# Patient Record
Sex: MALE | Race: WHITE | HISPANIC OR LATINO | URBAN - METROPOLITAN AREA
[De-identification: names, ages, dates, MRNs, and addresses within clinical notes are randomized per-mention and may not be internally consistent; named-entity substitution may affect disease eponyms.]

---

## 2018-07-13 ENCOUNTER — OUTPATIENT (OUTPATIENT)
Dept: OUTPATIENT SERVICES | Facility: HOSPITAL | Age: 48
LOS: 1 days | Discharge: HOME | End: 2018-07-13

## 2018-07-16 DIAGNOSIS — G47.33 OBSTRUCTIVE SLEEP APNEA (ADULT) (PEDIATRIC): ICD-10-CM

## 2019-03-11 ENCOUNTER — TRANSCRIPTION ENCOUNTER (OUTPATIENT)
Age: 49
End: 2019-03-11

## 2019-05-05 ENCOUNTER — EMERGENCY (EMERGENCY)
Facility: HOSPITAL | Age: 49
LOS: 0 days | Discharge: HOME | End: 2019-05-05
Attending: EMERGENCY MEDICINE | Admitting: EMERGENCY MEDICINE
Payer: COMMERCIAL

## 2019-05-05 VITALS
SYSTOLIC BLOOD PRESSURE: 137 MMHG | RESPIRATION RATE: 18 BRPM | OXYGEN SATURATION: 95 % | DIASTOLIC BLOOD PRESSURE: 67 MMHG | HEART RATE: 83 BPM | TEMPERATURE: 100 F

## 2019-05-05 VITALS
OXYGEN SATURATION: 96 % | TEMPERATURE: 98 F | SYSTOLIC BLOOD PRESSURE: 157 MMHG | DIASTOLIC BLOOD PRESSURE: 88 MMHG | RESPIRATION RATE: 18 BRPM | HEART RATE: 111 BPM
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
## 2019-05-05 DIAGNOSIS — F17.210 NICOTINE DEPENDENCE, CIGARETTES, UNCOMPLICATED: ICD-10-CM
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
## 2019-05-05 DIAGNOSIS — R10.30 LOWER ABDOMINAL PAIN, UNSPECIFIED: ICD-10-CM

## 2019-05-05 DIAGNOSIS — E11.9 TYPE 2 DIABETES MELLITUS WITHOUT COMPLICATIONS: ICD-10-CM

## 2019-05-05 DIAGNOSIS — N50.812 LEFT TESTICULAR PAIN: ICD-10-CM
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
## 2019-05-05 DIAGNOSIS — I10 ESSENTIAL (PRIMARY) HYPERTENSION: ICD-10-CM

## 2019-05-05 DIAGNOSIS — S39.94XA UNSPECIFIED INJURY OF EXTERNAL GENITALS, INITIAL ENCOUNTER: ICD-10-CM

## 2019-05-05 DIAGNOSIS — N39.0 URINARY TRACT INFECTION, SITE NOT SPECIFIED: ICD-10-CM

## 2019-05-05 LAB
ALBUMIN SERPL ELPH-MCNC: 4.7 G/DL — SIGNIFICANT CHANGE UP (ref 3.5–5.2)
ALP SERPL-CCNC: 69 U/L — SIGNIFICANT CHANGE UP (ref 30–115)
ALT FLD-CCNC: 31 U/L — SIGNIFICANT CHANGE UP (ref 0–41)
ANION GAP SERPL CALC-SCNC: 15 MMOL/L — HIGH (ref 7–14)
APPEARANCE UR: ABNORMAL
AST SERPL-CCNC: 20 U/L — SIGNIFICANT CHANGE UP (ref 0–41)
BACTERIA # UR AUTO: ABNORMAL /HPF
BILIRUB SERPL-MCNC: 0.5 MG/DL — SIGNIFICANT CHANGE UP (ref 0.2–1.2)
BILIRUB UR-MCNC: ABNORMAL
BUN SERPL-MCNC: 11 MG/DL — SIGNIFICANT CHANGE UP (ref 10–20)
CALCIUM SERPL-MCNC: 9.9 MG/DL — SIGNIFICANT CHANGE UP (ref 8.5–10.1)
CHLORIDE SERPL-SCNC: 103 MMOL/L — SIGNIFICANT CHANGE UP (ref 98–110)
CO2 SERPL-SCNC: 22 MMOL/L — SIGNIFICANT CHANGE UP (ref 17–32)
COLOR SPEC: SIGNIFICANT CHANGE UP
COMMENT - URINE: SIGNIFICANT CHANGE UP
CREAT SERPL-MCNC: 0.8 MG/DL — SIGNIFICANT CHANGE UP (ref 0.7–1.5)
DIFF PNL FLD: ABNORMAL
GLUCOSE SERPL-MCNC: 195 MG/DL — HIGH (ref 70–99)
GLUCOSE UR QL: 250
HCT VFR BLD CALC: 44.9 % — SIGNIFICANT CHANGE UP (ref 42–52)
HGB BLD-MCNC: 15.1 G/DL — SIGNIFICANT CHANGE UP (ref 14–18)
KETONES UR-MCNC: NEGATIVE — SIGNIFICANT CHANGE UP
LACTATE SERPL-SCNC: 1.8 MMOL/L — SIGNIFICANT CHANGE UP (ref 0.5–2.2)
LEUKOCYTE ESTERASE UR-ACNC: ABNORMAL
MCHC RBC-ENTMCNC: 29.2 PG — SIGNIFICANT CHANGE UP (ref 27–31)
MCHC RBC-ENTMCNC: 33.6 G/DL — SIGNIFICANT CHANGE UP (ref 32–37)
MCV RBC AUTO: 86.8 FL — SIGNIFICANT CHANGE UP (ref 80–94)
NITRITE UR-MCNC: NEGATIVE — SIGNIFICANT CHANGE UP
NRBC # BLD: 0 /100 WBCS — SIGNIFICANT CHANGE UP (ref 0–0)
PH UR: 5.5 — SIGNIFICANT CHANGE UP (ref 5–8)
PLATELET # BLD AUTO: 298 K/UL — SIGNIFICANT CHANGE UP (ref 130–400)
POTASSIUM SERPL-MCNC: 4.4 MMOL/L — SIGNIFICANT CHANGE UP (ref 3.5–5)
POTASSIUM SERPL-SCNC: 4.4 MMOL/L — SIGNIFICANT CHANGE UP (ref 3.5–5)
PROT SERPL-MCNC: 7.1 G/DL — SIGNIFICANT CHANGE UP (ref 6–8)
PROT UR-MCNC: 30
RBC # BLD: 5.17 M/UL — SIGNIFICANT CHANGE UP (ref 4.7–6.1)
RBC # FLD: 13 % — SIGNIFICANT CHANGE UP (ref 11.5–14.5)
RBC CASTS # UR COMP ASSIST: ABNORMAL /HPF
SODIUM SERPL-SCNC: 140 MMOL/L — SIGNIFICANT CHANGE UP (ref 135–146)
SP GR SPEC: >=1.03 — SIGNIFICANT CHANGE UP (ref 1.01–1.03)
UROBILINOGEN FLD QL: 0.2 — SIGNIFICANT CHANGE UP (ref 0.2–0.2)
WBC # BLD: 16.41 K/UL — HIGH (ref 4.8–10.8)
WBC # FLD AUTO: 16.41 K/UL — HIGH (ref 4.8–10.8)
WBC UR QL: ABNORMAL /HPF

## 2019-05-05 PROCEDURE — 99284 EMERGENCY DEPT VISIT MOD MDM: CPT

## 2019-05-05 PROCEDURE — 76870 US EXAM SCROTUM: CPT | Mod: 26

## 2019-05-05 RX ORDER — MORPHINE SULFATE 50 MG/1
4 CAPSULE, EXTENDED RELEASE ORAL ONCE
Qty: 0 | Refills: 0 | Status: DISCONTINUED | OUTPATIENT
Start: 2019-05-05 | End: 2019-05-05

## 2019-05-05 RX ORDER — CIPROFLOXACIN LACTATE 400MG/40ML
1 VIAL (ML) INTRAVENOUS
Qty: 10 | Refills: 0 | OUTPATIENT
Start: 2019-05-05 | End: 2019-05-14

## 2019-05-05 RX ADMIN — MORPHINE SULFATE 4 MILLIGRAM(S): 50 CAPSULE, EXTENDED RELEASE ORAL at 22:19

## 2019-05-05 RX ADMIN — MORPHINE SULFATE 4 MILLIGRAM(S): 50 CAPSULE, EXTENDED RELEASE ORAL at 17:35

## 2019-05-05 RX ADMIN — MORPHINE SULFATE 4 MILLIGRAM(S): 50 CAPSULE, EXTENDED RELEASE ORAL at 18:10

## 2019-05-05 NOTE — ED PROVIDER NOTE - PHYSICAL EXAMINATION
GENERAL:  well appearing, non-toxic male in no acute distress  SKIN: skin warm, pink and dry. MMM.   PULM: CTAB. Normal respiratory effort. No respiratory distress. No wheezes, stridor, rales or rhonchi. No retractions  CV: RRR, no M/R/G.   ABD: Soft, non-tender, non-distended. No rebound or guarding. No CVA tenderness.  : performed by Dr. Bellamy. + left sided testicular swelling and tenderness.  NEURO: A+Ox3, no sensory/motor deficits  PSYCH: appropriate behavior, cooperative.

## 2019-05-05 NOTE — ED ADULT NURSE NOTE - NSIMPLEMENTINTERV_GEN_ALL_ED
Implemented All Universal Safety Interventions:  [place] to call system. Call bell, personal items and telephone within reach. Instruct patient to call for assistance. Room bathroom lighting operational. Non-slip footwear when patient is off stretcher. Physically safe environment: no spills, clutter or unnecessary equipment. Stretcher in lowest position, wheels locked, appropriate side rails in place.

## 2019-05-05 NOTE — ED PROVIDER NOTE - NS ED ROS FT
Constitutional: no fever, chills  Cardiovascular: no chest pain, no sob, no syncope   Respiratory: no cough, no shortness of breath  Gastrointestinal: no nausea, vomiting or diarrhea. no abdominal pain.   : + left testicular pain and swelling, + left lower back pain.  no urinary sxs  Integumentary: no rash or skin changes. no edema  Neurological: no headache, no dizziness, no visual changes, no UE/LE weakness or paresthesias.

## 2019-05-05 NOTE — ED PROVIDER NOTE - PROGRESS NOTE DETAILS
Case discussed with GI fellow, recommends enemas every 4 hours for constipation. Offered patient enema in ED but patient prefers to do the enemas at home. Recommend follow up with GI in 1-2 days. On rectal exam no stool in distal rectal vault. s/o Dr. Garcia f/u arianne and re-katya I, Dr. Garcia, received signout from Dr. Bellamy - pending US results. Kavon carrasco- agree with above hx and PE. sono indicating enlaged epididimus and small hydrocele. no hematoma. will give levaquin for infected urine. Reviewed all results and necessity for follow up. Counseled on red flags and to return for them.  Patient appears well on discharge.

## 2019-05-05 NOTE — ED PROVIDER NOTE - ATTENDING CONTRIBUTION TO CARE
48 yr old male here for eval of left sided testicular pain. pt reports that while getting up from recliner fell and landed on his left testicle. pt feels the way he landed he squished his testicle. since then pain that radiates into abdomen. no fever, chill. pt has noticed blood in urine.  on exam abd soft nt/nd, no cva ttp, left testicle with swelling and tenderness.

## 2019-05-05 NOTE — ED PROVIDER NOTE - CLINICAL SUMMARY MEDICAL DECISION MAKING FREE TEXT BOX
pw testicular pain and swelling after trauma. w/u showing epididymitis w positive UA. Patient to be discharged from ED. Any available test results were discussed with patient and/or family. Verbal instructions given, including instructions to return to ED immediately for any new, worsening, or concerning symptoms. Patient endorsed understanding. Written discharge instructions additionally given, including follow-up plan with Urology.

## 2019-05-05 NOTE — ED PROVIDER NOTE - OBJECTIVE STATEMENT
49 yo male with h/o DM, HTN, HLD presents to the c/o left sided testicular pain x 2 days. Patient explains he was getting up from his recliner, fell back onto the recliner and thinks he landed on his left testicle, since then patient with left testicular pain with radiation to left lower abdominal pain and left flank pain. Patient noticed blood in his urine as well. Denies fever, chills, chest pain, sob, abd pain, N/V, UE/LE weakness or paresthesias, dysuria, increased frequency/urgency. Associated with left sided testicular swelling.

## 2019-05-05 NOTE — ED PROVIDER NOTE - CARE PLAN
Principal Discharge DX:	UTI (urinary tract infection)  Secondary Diagnosis:	Testicular pain  Secondary Diagnosis:	Fall

## 2019-05-05 NOTE — ED ADULT NURSE REASSESSMENT NOTE - NS ED NURSE REASSESS COMMENT FT1
received pt from triage with c/o left groin pain radiating to the left flank and swollen testicles. pt also stated that he noticed blood in his urine 2 days ago. Pt denies fever, SOB or chest pain. Awaiting to be seen by MD. Will continue to monitor.
Pt A&O X 3, NAD, VSS with groin pain 9/10.  Pt given morphine 4 mg as per MD order pain 7/10. Safety measures maintained, SR^, call bell within reach, family at bedside, steady gait, resting comfortably. pt awaiting further MD evaluation, will continue to monitor.

## 2019-05-05 NOTE — ED PROVIDER NOTE - CARE PROVIDER_API CALL
David Munroe)  Urology  [address] [place] [address], [address]  [place] [address]  Phone: [phone]  Fax: [phone]  Follow Up Time: 1-3 Days

## 2019-05-05 NOTE — ED PROVIDER NOTE - NSFOLLOWUPINSTRUCTIONS_ED_ALL_ED_FT
Fall Prevention in the Home  ImageFalls can cause injuries. They can happen to people of all ages. There are many things you can do to make your home safe and to help prevent falls.    What can I do on the outside of my home?  Regularly fix the edges of walkways and driveways and fix any cracks.  Remove anything that might make you trip as you walk through a door, such as a raised step or threshold.  Trim any bushes or trees on the path to your home.  Use bright outdoor lighting.  Clear any walking paths of anything that might make someone trip, such as rocks or tools.  Regularly check to see if handrails are loose or broken. Make sure that both sides of any steps have handrails.  Any raised decks and porches should have guardrails on the edges.  Have any leaves, snow, or ice cleared regularly.  Use sand or salt on walking paths during winter.  Clean up any spills in your garage right away. This includes oil or grease spills.  What can I do in the bathroom?  Use night lights.  Install grab bars by the toilet and in the tub and shower. Do not use towel bars as grab bars.  Use non-skid mats or decals in the tub or shower.  If you need to sit down in the shower, use a plastic, non-slip stool.  Keep the floor dry. Clean up any water that spills on the floor as soon as it happens.  Remove soap buildup in the tub or shower regularly.  Attach bath mats securely with double-sided non-slip rug tape.  Do not have throw rugs and other things on the floor that can make you trip.  What can I do in the bedroom?  Use night lights.  Make sure that you have a light by your bed that is easy to reach.  Do not use any sheets or blankets that are too big for your bed. They should not hang down onto the floor.  Have a firm chair that has side arms. You can use this for support while you get dressed.  Do not have throw rugs and other things on the floor that can make you trip.  What can I do in the kitchen?  Clean up any spills right away.  Avoid walking on wet floors.  Keep items that you use a lot in easy-to-reach places.  If you need to reach something above you, use a strong step stool that has a grab bar.  Keep electrical cords out of the way.  Do not use floor polish or wax that makes floors slippery. If you must use wax, use non-skid floor wax.  Do not have throw rugs and other things on the floor that can make you trip.  What can I do with my stairs?  Do not leave any items on the stairs.  Make sure that there are handrails on both sides of the stairs and use them. Fix handrails that are broken or loose. Make sure that handrails are as long as the stairways.  Check any carpeting to make sure that it is firmly attached to the stairs. Fix any carpet that is loose or worn.  Avoid having throw rugs at the top or bottom of the stairs. If you do have throw rugs, attach them to the floor with carpet tape.  Make sure that you have a light switch at the top of the stairs and the bottom of the stairs. If you do not have them, ask someone to add them for you.  What else can I do to help prevent falls?  Wear shoes that:    Do not have high heels.  Have rubber bottoms.  Are comfortable and fit you well.  Are closed at the toe. Do not wear sandals.    If you use a stepladder:    Make sure that it is fully opened. Do not climb a closed stepladder.  Make sure that both sides of the stepladder are locked into place.  Ask someone to hold it for you, if possible.    Clearly winter and make sure that you can see:    Any grab bars or handrails.  First and last steps.  Where the edge of each step is.    Use tools that help you move around (mobility aids) if they are needed. These include:    Canes.  Walkers.  Scooters.  Crutches.    Turn on the lights when you go into a dark area. Replace any light bulbs as soon as they burn out.  Set up your furniture so you have a clear path. Avoid moving your furniture around.  If any of your floors are uneven, fix them.  If there are any pets around you, be aware of where they are.  Review your medicines with your doctor. Some medicines can make you feel dizzy. This can increase your chance of falling.  Ask your doctor what other things that you can do to help prevent falls.    This information is not intended to replace advice given to you by your health care provider. Make sure you discuss any questions you have with your health care provider.     Urinary Tract Infection    A urinary tract infection (UTI) is an infection of any part of the urinary tract, which includes the kidneys, ureters, bladder, and urethra. Risk factors include ignoring your need to urinate, wiping back to front if female, being an uncircumcised male, and having diabetes or a weak immune system. Symptoms include frequent urination, pain or burning with urination, foul smelling urine, cloudy urine, pain in the lower abdomen, blood in the urine, and fever. If you were prescribed an antibiotic medicine, take it as told by your health care provider. Do not stop taking the antibiotic even if you start to feel better.    SEEK IMMEDIATE MEDICAL CARE IF YOU HAVE ANY OF THE FOLLOWING SYMPTOMS: severe back or abdominal pain, fever, inability to keep fluids or medicine down, dizziness/lightheadedness, or a change in mental status.

## 2019-05-06 LAB
CULTURE RESULTS: NO GROWTH — SIGNIFICANT CHANGE UP
SPECIMEN SOURCE: SIGNIFICANT CHANGE UP

## 2019-05-08 PROBLEM — Z00.00 ENCOUNTER FOR PREVENTIVE HEALTH EXAMINATION: Status: ACTIVE | Noted: 2019-05-08

## 2019-05-09 ENCOUNTER — APPOINTMENT (OUTPATIENT)
Dept: UROLOGY | Facility: CLINIC | Age: 49
End: 2019-05-09
Payer: COMMERCIAL

## 2019-05-09 ENCOUNTER — OUTPATIENT (OUTPATIENT)
Dept: OUTPATIENT SERVICES | Facility: HOSPITAL | Age: 49
LOS: 1 days | Discharge: HOME | End: 2019-05-09

## 2019-05-09 VITALS
SYSTOLIC BLOOD PRESSURE: 144 MMHG | DIASTOLIC BLOOD PRESSURE: 82 MMHG | BODY MASS INDEX: 38.51 KG/M2 | WEIGHT: 269 LBS | HEIGHT: 70 IN | HEART RATE: 91 BPM

## 2019-05-09 DIAGNOSIS — E78.00 PURE HYPERCHOLESTEROLEMIA, UNSPECIFIED: ICD-10-CM

## 2019-05-09 DIAGNOSIS — F17.200 NICOTINE DEPENDENCE, UNSPECIFIED, UNCOMPLICATED: ICD-10-CM

## 2019-05-09 DIAGNOSIS — Z82.49 FAMILY HISTORY OF ISCHEMIC HEART DISEASE AND OTHER DISEASES OF THE CIRCULATORY SYSTEM: ICD-10-CM

## 2019-05-09 DIAGNOSIS — Z80.7 FAMILY HISTORY OF OTHER MALIGNANT NEOPLASMS OF LYMPHOID, HEMATOPOIETIC AND RELATED TISSUES: ICD-10-CM

## 2019-05-09 DIAGNOSIS — Z83.3 FAMILY HISTORY OF DIABETES MELLITUS: ICD-10-CM

## 2019-05-09 PROCEDURE — 99204 OFFICE O/P NEW MOD 45 MIN: CPT

## 2019-05-09 RX ORDER — GLIMEPIRIDE 4 MG/1
4 TABLET ORAL
Refills: 0 | Status: ACTIVE | COMMUNITY

## 2019-05-09 RX ORDER — CALCIUM CARBONATE/VITAMIN D3 600 MG-10
TABLET ORAL
Refills: 0 | Status: ACTIVE | COMMUNITY

## 2019-05-09 RX ORDER — DULAGLUTIDE 1.5 MG/.5ML
1.5 INJECTION, SOLUTION SUBCUTANEOUS
Refills: 0 | Status: ACTIVE | COMMUNITY

## 2019-05-09 RX ORDER — FENOFIBRATE 145 MG/1
145 TABLET ORAL
Refills: 0 | Status: ACTIVE | COMMUNITY

## 2019-05-10 DIAGNOSIS — R79.89 OTHER SPECIFIED ABNORMAL FINDINGS OF BLOOD CHEMISTRY: ICD-10-CM

## 2019-05-10 LAB
APPEARANCE: CLEAR
BILIRUBIN URINE: NEGATIVE
BLOOD URINE: NEGATIVE
COLOR: YELLOW
GLUCOSE QUALITATIVE U: >=1000
KETONES URINE: NEGATIVE
LEUKOCYTE ESTERASE URINE: NEGATIVE
NITRITE URINE: NEGATIVE
PH URINE: 6
PROTEIN URINE: NEGATIVE
SPECIFIC GRAVITY URINE: >=1.03
UROBILINOGEN URINE: 0.2 (ref 0.2–?)

## 2019-05-13 LAB
BACTERIA UR CULT: NORMAL
URINE CYTOLOGY: NORMAL

## 2019-08-05 ENCOUNTER — APPOINTMENT (OUTPATIENT)
Dept: UROLOGY | Facility: CLINIC | Age: 49
End: 2019-08-05
Payer: COMMERCIAL

## 2019-08-05 VITALS
HEIGHT: 70 IN | SYSTOLIC BLOOD PRESSURE: 144 MMHG | DIASTOLIC BLOOD PRESSURE: 82 MMHG | HEART RATE: 91 BPM | WEIGHT: 269 LBS | BODY MASS INDEX: 38.51 KG/M2

## 2019-08-05 PROCEDURE — 99213 OFFICE O/P EST LOW 20 MIN: CPT

## 2019-08-05 RX ORDER — LEVOFLOXACIN 500 MG/1
500 TABLET, FILM COATED ORAL
Refills: 0 | Status: COMPLETED | COMMUNITY
End: 2019-08-05

## 2019-08-05 NOTE — PHYSICAL EXAM
[General Appearance - Well Developed] : well developed [Normal Appearance] : normal appearance [General Appearance - Well Nourished] : well nourished [General Appearance - In No Acute Distress] : no acute distress [Well Groomed] : well groomed [Heart Rate And Rhythm] : Heart rate and rhythm were normal [] : no respiratory distress [Exaggerated Use Of Accessory Muscles For Inspiration] : no accessory muscle use [Respiration, Rhythm And Depth] : normal respiratory rhythm and effort [Auscultation Breath Sounds / Voice Sounds] : lungs were clear to auscultation bilaterally [Abdomen Soft] : soft [Abdomen Tenderness] : non-tender [Abdomen Hernia] : no hernia was discovered [Costovertebral Angle Tenderness] : no ~M costovertebral angle tenderness [Urethral Meatus] : meatus normal [Penis Abnormality] : normal circumcised penis [No Focal Deficits] : no focal deficits [Affect] : the affect was normal [Oriented To Time, Place, And Person] : oriented to person, place, and time [Mood] : the mood was normal [Not Anxious] : not anxious [Inguinal Lymph Nodes Enlarged Bilaterally] : inguinal [FreeTextEntry1] : DTR's were intact

## 2019-08-05 NOTE — PHYSICAL EXAM
[General Appearance - Well Developed] : well developed [General Appearance - Well Nourished] : well nourished [Normal Appearance] : normal appearance [Well Groomed] : well groomed [General Appearance - In No Acute Distress] : no acute distress [Respiration, Rhythm And Depth] : normal respiratory rhythm and effort [Edema] : no peripheral edema [] : no respiratory distress [Exaggerated Use Of Accessory Muscles For Inspiration] : no accessory muscle use [Oriented To Time, Place, And Person] : oriented to person, place, and time [Affect] : the affect was normal [Not Anxious] : not anxious [Mood] : the mood was normal [Normal Station and Gait] : the gait and station were normal for the patient's age [No Focal Deficits] : no focal deficits [FreeTextEntry1] : Morbidly obes

## 2019-08-05 NOTE — LETTER HEADER
[FreeTextEntry3] : David Munroe M.D.\par Director of Urology\par [facility]\par [address] [place] [address], [address]\[place] [address]

## 2019-08-05 NOTE — ASSESSMENT
[FreeTextEntry1] : His physical exam in addition to the morbid obesity shows him to be is distinctly uncomfortable to the point that he walks very slowly and can get back on the examination bed with out difficulty. He has morbid obesity with about 40% of the shaft of the penis buried in the suprapubic fat. The right testicle is of normal size hard to believe that he has hypogonadism with the testicle that size the left is enlarged swollen with the overlying skin somewhat indurated with edema. Rectal exam showed about a 50 g a nodular prostate he had good rectal tone. Because of the buried penis by the tender testicle I did not try for BC reflex.\par \par With respect to the epididymitis he is getting better, if he doesn’t improve significantly by next week will get another ultrasound as the timing related to the trauma implies more traumatic event an infection but he did have pyuria albeit without bacteria. With respect to his gross hematuria especially given the fact that his microscopic exam had a fair number of red cells will get a repeat the urine but I’m going to get a CT urogram. He is a strong smoking history for many many many years now down to half a pack a day but this needs to be checked out. With respect to his low testosterone I want to see where he is. If he is indeed low it may be due to hyper convergence secondary to his obesity and he may need off label use of Arimidex as opposed to straight testosterone. Finally with respect to his erectile dysfunction going to need to know his [place] criteria classification and I want to make sure his hormones are in order because of he is hypogonadal correcting that may correct his erectile dysfunction with the same time.\par \par

## 2019-08-05 NOTE — LETTER BODY
[Dear  ___] : Dear  [unfilled], [FreeTextEntry2] : Deborah Lombardo DO\par [address]\[place] [address]\par

## 2019-08-05 NOTE — REVIEW OF SYSTEMS
[see HPI] : see HPI [Erectile Dysfunction] : erectile dysfunction [Negative] : Heme/Lymph [Fever] : no fever

## 2019-08-05 NOTE — HISTORY OF PRESENT ILLNESS
[Currently Experiencing ___] :  [unfilled] [Erectile Dysfunction] : Erectile Dysfunction [Hematuria - Gross] : gross hematuria [None] : None [FreeTextEntry1] : Christopher is a  48-year-old male who has a long history of urologic issues including erectile dysfunction, gross hematuria, and epididymitis.\par \par Concerning his erectile dysfunction he is complaining of decreased penile rigidity with intermittent episode of losing his erections during intercourse. He presents today to review his [place] criteria.\par \par He had episode of gross hematuria after he fell down and at which time he believes he suffered a testicular injury. Ultrasound in the emergency room was consistent with left epididymitis without traumatic evidence. His urine analysis showed some pyuria so he was sent home with a diagnosis of UTI and epididymitis on Levaquin. By now His testicular pain has resolved. He was supposed to obtain a CT urogram however, he did not do so stating that his gross hematuria has resolved. He now understands that any episode of gross hematuria requires an appropriate workup\par \par Additionally he Reports that he has a history of decreased testosterone managed in the past by testosterone injections. He says that he tried and failed the cream in the past. He presents to review his blood work

## 2019-08-05 NOTE — LETTER BODY
[Dear  ___] : Dear  [unfilled], [Courtesy Letter:] : I had the pleasure of seeing your patient, [unfilled], in my office today. [Please see my note below.] : Please see my note below. [Consult Closing:] : Thank you very much for allowing me to participate in the care of this patient.  If you have any questions, please do not hesitate to contact me. [Sincerely,] : Sincerely, [FreeTextEntry2] : Deborah Lombardo DO\par [address]\[place] [address]\par

## 2019-08-05 NOTE — LETTER HEADER
[FreeTextEntry3] : David Munroe M.D.\par Director of Urology\par [facility]\par [address] [place] [address], [address]\[place] [address]

## 2019-08-05 NOTE — ASSESSMENT
[FreeTextEntry1] : He did not obtain a CT urogram or the appropriate blood work. I reviewed as to why we ordered what we ordered including appropriate hematuria workup and testosterone level evaluation.\par \par Concerning his erectile dysfunction, he did obtain clearance to begin PDE 5 inhibitors. He will start sildenafil 100 mg, one half or up to one tablet prior to intercourse. All usage, dosage, mechanism of action, and adverse events have been fully reviewed. He is not to take more than 100 mg in a 24-hour period.\par \par He will get CT urogram and the appropriate blood work and follow up for review of the studies as well as his to the Sildenafil.\par

## 2019-08-05 NOTE — HISTORY OF PRESENT ILLNESS
[Currently Experiencing ___] :  [unfilled] [Erectile Dysfunction] : Erectile Dysfunction [Hematuria - Gross] : gross hematuria [8] : 8 [None] : There is no radiation [Sudden] : sudden [___ Day(s) Ago] : [unfilled] day(s) ago [Constant] : constantly [Improving] : improving [Moderate] : moderate in severity [FreeTextEntry1] : Christopher is a rather uncomfortable 48-year-old male who has a long history of urologic issues for which he never went to anyone because he was embarrassed. They one of longest duration is probably erectile dysfunction. He can get rigid he can achieve penetration but it is not reliable and sometimes he can complete the act. He has no one precipitating factor it’s been happening slowly over time and he is not tried any medication for it. He is in a monogamous relationship with one lady friend they have a good relationship but sex just isn’t there and he doesn’t think the problem is her.\par \par About a month ago he started noticing some pink urine was a little busy so didn’t have a chance to go to was  However four days ago he was getting out of his chair fell back he thinks he landed on his testicle had severe pain and went to the emergency room. An ultrasound was done which was consistent with left epididymitis. His urine analysis showed some pyuria so [facility] and epididymitis on Levaquin (he tells me he was not told re-the risk of tendon rupture I did tell him that now and he noted 30 days since the last dose before he can do any heavy lifting). The urine culture came back as no growth but that doesn’t mean it was in an epididymitis. Since leaving the emergency room it finally started to get a little better but he still has trouble sitting back on a chair or crossing his legs. Additionally he had been told he had a low testosterone the past. He been put on medication his doctor left the practice of the covering physician told him that his numbers were too good he has not had any medication attesting in over a year but feels that he is low and felt better when he was being treated. I do not know if he was being juiced or was appropriately and a level and he doesn’t have copies of his bloods.\par  [de-identified] : lef testicle [de-identified] : touch or movement

## 2019-08-08 ENCOUNTER — OTHER (OUTPATIENT)
Age: 49
End: 2019-08-08

## 2019-12-05 ENCOUNTER — APPOINTMENT (OUTPATIENT)
Dept: UROLOGY | Facility: CLINIC | Age: 49
End: 2019-12-05
Payer: COMMERCIAL

## 2019-12-05 VITALS
SYSTOLIC BLOOD PRESSURE: 180 MMHG | WEIGHT: 269 LBS | HEIGHT: 70 IN | DIASTOLIC BLOOD PRESSURE: 77 MMHG | HEART RATE: 84 BPM | BODY MASS INDEX: 38.51 KG/M2

## 2019-12-05 DIAGNOSIS — N45.1 EPIDIDYMITIS: ICD-10-CM

## 2019-12-05 PROCEDURE — 99213 OFFICE O/P EST LOW 20 MIN: CPT

## 2019-12-09 NOTE — ASSESSMENT
[FreeTextEntry1] : And becomes even more His erections improved the sildenafil however, he is still symptomatic. His testosterone is significantly low and we have two sets of blood work confirming this. At this time he elects to begin testosterone. We discussed different modalities and he wishes to start with injectable testosterone. We will start with 1 mg q. 2 weeks obtain trough and peak see him back for review. He'll bring in testosterone for injection  training.\par \par Concerning his PSA, it is elevated for his age and of concern is this is even more of an issue considering his hypogonadism. He will obtain repeat PSA, and he will get trough/peak values. If the PSA significantly elevated we'll discuss further intervention.\par \par Finally, he needs a CT urogram as had an episode of gross hematuria and has significant smoking history. We reviewed the indications and he agrees. He understands the risks involved without having a proper hematuria workup.  I pointed out to him that this is the second time he followed up without getting a CT urogram. We had discussed the appropriate workup and what it entails. \par

## 2019-12-09 NOTE — PHYSICAL EXAM

## 2019-12-09 NOTE — LETTER HEADER
[FreeTextEntry3] : David Munroe M.D.\par Director of Urology\par [facility]\par [address] [place] [address], [address]\[place] [address]

## 2019-12-09 NOTE — HISTORY OF PRESENT ILLNESS
[Currently Experiencing ___] :  [unfilled] [Erectile Dysfunction] : Erectile Dysfunction [FreeTextEntry1] : Christopher is a  48-year-old male who has a long history of urologic issues including erectile dysfunction, and history of gross hematuria\par \par At his last visit he was given sildenafil 100 mg, after we reviewed his [place] criteria. He reports significant improvement in his erectile quality however, he still has decreased libido.\par \par He has history of decreased testosterone and was managed on intramuscular testosterone over a year ago. His prior blood work from July 2019 shows low total testosterone. He presents today to review his recent testosterone labs.\par \zain Additionally has a history of gross hematuria and we’d recommended he obtain a CT urogram however, he did not do so. \par

## 2019-12-09 NOTE — LETTER BODY
[Dear  ___] : Dear  [unfilled], [Courtesy Letter:] : I had the pleasure of seeing your patient, [unfilled], in my office today. [Please see my note below.] : Please see my note below. [Sincerely,] : Sincerely, [Consult Closing:] : Thank you very much for allowing me to participate in the care of this patient.  If you have any questions, please do not hesitate to contact me. [FreeTextEntry2] : Deborah Lombardo DO\par [address]\[place] [address]\par

## 2019-12-12 ENCOUNTER — FORM ENCOUNTER (OUTPATIENT)
Age: 49
End: 2019-12-12

## 2019-12-13 ENCOUNTER — OUTPATIENT (OUTPATIENT)
Dept: OUTPATIENT SERVICES | Facility: HOSPITAL | Age: 49
LOS: 1 days | Discharge: HOME | End: 2019-12-13
Payer: COMMERCIAL

## 2019-12-13 DIAGNOSIS — R31.0 GROSS HEMATURIA: ICD-10-CM

## 2019-12-13 PROCEDURE — 74178 CT ABD&PLV WO CNTR FLWD CNTR: CPT | Mod: 26

## 2019-12-18 ENCOUNTER — APPOINTMENT (OUTPATIENT)
Dept: UROLOGY | Facility: CLINIC | Age: 49
End: 2019-12-18
Payer: COMMERCIAL

## 2019-12-18 ENCOUNTER — TRANSCRIPTION ENCOUNTER (OUTPATIENT)
Age: 49
End: 2019-12-18

## 2019-12-18 VITALS
HEART RATE: 91 BPM | WEIGHT: 272 LBS | BODY MASS INDEX: 38.94 KG/M2 | SYSTOLIC BLOOD PRESSURE: 154 MMHG | HEIGHT: 70 IN | DIASTOLIC BLOOD PRESSURE: 82 MMHG

## 2019-12-18 DIAGNOSIS — E11.9 TYPE 2 DIABETES MELLITUS W/OUT COMPLICATIONS: ICD-10-CM

## 2019-12-18 PROCEDURE — 99213 OFFICE O/P EST LOW 20 MIN: CPT

## 2019-12-18 NOTE — LETTER BODY
[Please see my note below.] : Please see my note below. [Courtesy Letter:] : I had the pleasure of seeing your patient, [unfilled], in my office today. [Dear  ___] : Dear  [unfilled], [Sincerely,] : Sincerely, [Consult Closing:] : Thank you very much for allowing me to participate in the care of this patient.  If you have any questions, please do not hesitate to contact me. [FreeTextEntry2] : Deborah Lombardo DO\par [address]\[place] [address]\par

## 2019-12-18 NOTE — HISTORY OF PRESENT ILLNESS
[FreeTextEntry1] : Christopher was seen on December 5 we sent him for a CT urogram which she had not done we had discussed the importance of it he agreed to go and had it done December 13. The report was positive so I had him bring the medication and had him come in early so we could review the issues. He is here today for review possible injection training. [Currently Experiencing ___] :  [unfilled] [Erectile Dysfunction] : Erectile Dysfunction [Hematuria - Gross] : gross hematuria [None] : There is no radiation [___ Day(s) Ago] : [unfilled] day(s) ago [Constant] : constantly [Sudden] : sudden [Improving] : improving [Moderate] : moderate in severity [de-identified] : left testicle now without pain [de-identified] : touch or movement

## 2019-12-18 NOTE — PHYSICAL EXAM
[General Appearance - Well Developed] : well developed [Normal Appearance] : normal appearance [General Appearance - Well Nourished] : well nourished [Well Groomed] : well groomed [General Appearance - In No Acute Distress] : no acute distress [] : no respiratory distress [Exaggerated Use Of Accessory Muscles For Inspiration] : no accessory muscle use [Respiration, Rhythm And Depth] : normal respiratory rhythm and effort [Oriented To Time, Place, And Person] : oriented to person, place, and time [Affect] : the affect was normal [Mood] : the mood was normal [FreeTextEntry1] : anxious re the results [Normal Station and Gait] : the gait and station were normal for the patient's age

## 2019-12-18 NOTE — LETTER HEADER
[FreeTextEntry3] : David Munroe M.D.\par Director of Urology\par [facility]\par [address] [place] [address], [address]\[place] [address]

## 2019-12-18 NOTE — ASSESSMENT
[FreeTextEntry1] : I reviewed the films with Christopher on the computer. One of his major concerns was dialysis and I showed him how his left kidney looked completely normal and all else being equal that is not expected. As far as regionally invasive for metastatic disease obviously that is not something we can tell and days out of work intra-op risk etc. are things he will discuss with our cancer specialist Dr. Urbano as I haven’t taken had a kidney in 10 years. I called him in today early at they did not want him coming in to meet someone he’d never known to talk about having part of his kidney taken out and I felt it better if I reviewed the films with him and then explained to him the options a Dr. Urbano will be discussing with him.\par \par As far as his IM injections is a little anxious right now and I’m not sure Dr. Urbano would want him started on the medication now always until postop. If he feels it can be done concurrently and in my opinion may actually be helpful as with testosterone on board your healing is better than all well and good if you’d like to hold off his is always a chance are side effects and he does not want to rock the boat then we will wait and do this after the kidney surgery issue is resolved.\par

## 2019-12-19 ENCOUNTER — APPOINTMENT (OUTPATIENT)
Dept: UROLOGY | Facility: CLINIC | Age: 49
End: 2019-12-19
Payer: COMMERCIAL

## 2019-12-19 PROCEDURE — 99215 OFFICE O/P EST HI 40 MIN: CPT

## 2019-12-19 NOTE — PHYSICAL EXAM
[General Appearance - Well Developed] : well developed [General Appearance - Well Nourished] : well nourished [Normal Appearance] : normal appearance [Well Groomed] : well groomed [General Appearance - In No Acute Distress] : no acute distress [Edema] : no peripheral edema [Respiration, Rhythm And Depth] : normal respiratory rhythm and effort [Exaggerated Use Of Accessory Muscles For Inspiration] : no accessory muscle use [Abdomen Soft] : soft [Abdomen Tenderness] : non-tender [Costovertebral Angle Tenderness] : no ~M costovertebral angle tenderness [FreeTextEntry1] : obese, midline exlap scar starting at umb to mid epigastrum [Urinary Bladder Findings] : the bladder was normal on palpation [Normal Station and Gait] : the gait and station were normal for the patient's age [] : no rash [No Focal Deficits] : no focal deficits [Oriented To Time, Place, And Person] : oriented to person, place, and time [Affect] : the affect was normal [Mood] : the mood was normal [Not Anxious] : not anxious [No Palpable Adenopathy] : no palpable adenopathy

## 2019-12-19 NOTE — ASSESSMENT
[FreeTextEntry1] : VENTURA RAO is a 49 year old male hx obesity, DM who presents with hx microscopic hematuria found to have a 4.6 cm right renal mass.\par CT abdomen and pelvis images visualized and uwiinxua22/2019 and there is a right upper pole 4.6 cm renal mass w exophytic component but mostly endophytic.\par Cr 0.8 eGFR 104\par PSH strangulated ventral hernia and bowel resection 2009 open exlap\par \par Plan for right robotic partial nephrectomy. Discussed there is a higher chance for needing radical nephrectomy the setting of T1b however we'll try to perform a partial nephrectomy.\par Smoking cessation counseling.\par \par \par Renal mass discussion:\par The dx of renal mass was discussed in great detail.\par \par We discussed the fact that enhancement within a renal mass suggests malignancy, but that a benign renal tumor cannot be excluded.\par \par Overall the risks of CA appears to be about 80%.\par \par We discussed the problems with renal bx, the limited indications, and the need to treat based primarily on radiographic findings.  The patient appears to understand that there is about a 20% chance that this mass may be benign. We discussed the pros and cons of renal mass biopsy.\par We also discussed that active surveillance of renal masses (typically <3 cm) is a perfectly reasonable option as stated in the AUA guidelines. \par \par Regarding treatment, we discussed radical nephrectomy vs. partial nephrectomy.\par With respect to P Nx we discussed a potential advantage with renal function long term.  We discussed the risk risk of urinoma, bleeding, infection, possible need for reoperation, local recurrence.  We also discussed the potential need for Rad Nx dependent on intraoperative findings.  For both R Nx and P Nx, we discussed risk of any major surgery, including but not limited to MI, CVA, DVT, infection, blood transfusion, wound healing problems, injury to surrounding structures (i.e. including but not limited to bowel or other adjacent organs), positioning injuries.  All of these issues were reviewed.  We discussed risk of cancer recurrence and potential need for additional therapy.  We also discussed risk of renal insufficiency and dialysis short and long-term with R Nx.  For partial nephrectomy, we discussed possible need to convert to R Nx dependent on intraoperative findings and anatomy.\par \par We also discussed open vs. laparoscopic/robotic surgery and advantages and disadvantages each way. For laparoscopic/robotic surgery, we discussed possibility that conversion to open surgery might be required dependent on intraoperative findings and anatomy.  \par \par We also discussed renal ablative therapies such as cryo Rx and RFA.  We reviewed the data for these modalities and the overall encouraging findings thus far, but also discussed the fact that long-term cancer control issues remain unproven due to the still somewhat novel status of these modalities.\par \par In summary, we discussed the procedure, risks, potential benefits, and reasonable alternatives. All questions were answered.  \par I asked this patient to call if any additional questions or concerns.\par

## 2019-12-19 NOTE — HISTORY OF PRESENT ILLNESS
[FreeTextEntry1] : VENTURA RAO is a 49 year old male hx obesity, DM who presents with hx microscopic hematuria found to have a 4.6 cm right renal mass.\par CT abdomen and pelvis images visualized and reviewed 12/2019 and there is a right upper pole 4.6 cm renal mass w exophytic component but mostly endophytic.\par Cr 0.8 eGFR 104\par \par Denies gross hematuria, dysuria or associated symptoms. Denies pelvic pain.\par \par Denies  PMH including previous kidney stones, recurrent UTIs. \par PSH strangulated ventral hernia and bowel resection 2009 open exlap\par Family History: No  malignancies\par Soc hx smoker,  mta\par \par Old records reviewed\par

## 2020-02-20 ENCOUNTER — OUTPATIENT (OUTPATIENT)
Dept: OUTPATIENT SERVICES | Facility: HOSPITAL | Age: 50
LOS: 1 days | Discharge: HOME | End: 2020-02-20
Payer: COMMERCIAL

## 2020-02-20 DIAGNOSIS — Z01.818 ENCOUNTER FOR OTHER PREPROCEDURAL EXAMINATION: ICD-10-CM

## 2020-02-20 PROCEDURE — 71046 X-RAY EXAM CHEST 2 VIEWS: CPT | Mod: 26

## 2020-02-26 ENCOUNTER — APPOINTMENT (OUTPATIENT)
Dept: UROLOGY | Facility: HOSPITAL | Age: 50
End: 2020-02-26

## 2020-06-15 ENCOUNTER — APPOINTMENT (OUTPATIENT)
Dept: UROLOGY | Facility: CLINIC | Age: 50
End: 2020-06-15
Payer: COMMERCIAL

## 2020-06-15 VITALS
DIASTOLIC BLOOD PRESSURE: 93 MMHG | HEIGHT: 70 IN | BODY MASS INDEX: 37.94 KG/M2 | HEART RATE: 86 BPM | WEIGHT: 265 LBS | SYSTOLIC BLOOD PRESSURE: 152 MMHG

## 2020-06-15 DIAGNOSIS — Z87.448 PERSONAL HISTORY OF OTHER DISEASES OF URINARY SYSTEM: ICD-10-CM

## 2020-06-15 DIAGNOSIS — N28.89 OTHER SPECIFIED DISORDERS OF KIDNEY AND URETER: ICD-10-CM

## 2020-06-15 DIAGNOSIS — C64.1 MALIGNANT NEOPLASM OF RIGHT KIDNEY, EXCEPT RENAL PELVIS: ICD-10-CM

## 2020-06-15 PROCEDURE — 99214 OFFICE O/P EST MOD 30 MIN: CPT

## 2020-06-15 RX ORDER — SITAGLIPTIN AND METFORMIN HYDROCHLORIDE 50; 1000 MG/1; MG/1
TABLET, FILM COATED ORAL
Refills: 0 | Status: COMPLETED | COMMUNITY
End: 2020-06-15

## 2020-06-15 RX ORDER — ICOSAPENT ETHYL 1000 MG/1
1 CAPSULE ORAL
Qty: 180 | Refills: 0 | Status: ACTIVE | COMMUNITY
Start: 2020-06-09

## 2020-06-15 RX ORDER — OXYCODONE 5 MG/1
5 TABLET ORAL
Qty: 12 | Refills: 0 | Status: COMPLETED | COMMUNITY
Start: 2020-03-06

## 2020-06-15 RX ORDER — FENOFIBRATE 145 MG/1
TABLET ORAL
Refills: 0 | Status: COMPLETED | COMMUNITY
End: 2020-06-15

## 2020-06-15 RX ORDER — ERGOCALCIFEROL 1.25 MG/1
1.25 MG CAPSULE, LIQUID FILLED ORAL
Qty: 4 | Refills: 0 | Status: ACTIVE | COMMUNITY
Start: 2020-05-02

## 2020-06-15 RX ORDER — MULTIVIT-MINS/IRON/FOLIC/LYCOP 8-200-600
70 TABLET ORAL
Qty: 120 | Refills: 0 | Status: ACTIVE | COMMUNITY
Start: 2020-05-27

## 2020-06-15 RX ORDER — SITAGLIPTIN AND METFORMIN HYDROCHLORIDE 50; 1000 MG/1; MG/1
50-1000 TABLET, FILM COATED ORAL
Refills: 0 | Status: COMPLETED | COMMUNITY
End: 2020-06-15

## 2020-06-15 RX ORDER — LANCETS 28 GAUGE
EACH MISCELLANEOUS
Qty: 100 | Refills: 0 | Status: ACTIVE | COMMUNITY
Start: 2020-05-28

## 2020-06-15 RX ORDER — SITAGLIPTIN AND METFORMIN HYDROCHLORIDE 50; 1000 MG/1; MG/1
50-1000 TABLET, FILM COATED, EXTENDED RELEASE ORAL
Qty: 180 | Refills: 0 | Status: ACTIVE | COMMUNITY
Start: 2020-06-09

## 2020-06-15 RX ORDER — POLYETHYLENE GLYCOL 3350 17 G/17G
17 POWDER, FOR SOLUTION ORAL
Qty: 14 | Refills: 0 | Status: COMPLETED | COMMUNITY
Start: 2020-03-06

## 2020-06-15 RX ORDER — BLOOD SUGAR DIAGNOSTIC
STRIP MISCELLANEOUS
Qty: 200 | Refills: 0 | Status: ACTIVE | COMMUNITY
Start: 2020-06-09

## 2020-06-15 NOTE — PHYSICAL EXAM
[General Appearance - Well Developed] : well developed [General Appearance - Well Nourished] : well nourished [Normal Appearance] : normal appearance [Well Groomed] : well groomed [General Appearance - In No Acute Distress] : no acute distress [Abdomen Soft] : soft [Abdomen Tenderness] : non-tender [Costovertebral Angle Tenderness] : no ~M costovertebral angle tenderness [Abdomen Hernia] : no hernia was discovered [Heart Rate And Rhythm] : Heart rate and rhythm were normal [Edema] : no peripheral edema [Respiration, Rhythm And Depth] : normal respiratory rhythm and effort [] : no respiratory distress [Exaggerated Use Of Accessory Muscles For Inspiration] : no accessory muscle use [Oriented To Time, Place, And Person] : oriented to person, place, and time [Auscultation Breath Sounds / Voice Sounds] : lungs were clear to auscultation bilaterally [Mood] : the mood was normal [Affect] : the affect was normal [Not Anxious] : not anxious [Normal Station and Gait] : the gait and station were normal for the patient's age [FreeTextEntry1] : scars from partial nephrectomy

## 2020-06-15 NOTE — REVIEW OF SYSTEMS
[Arthralgias] : arthralgias [see HPI] : see HPI [Erectile Dysfunction] : erectile dysfunction [Negative] : Heme/Lymph

## 2020-06-15 NOTE — LETTER BODY
[Dear  ___] : Dear  [unfilled], [Courtesy Letter:] : I had the pleasure of seeing your patient, [unfilled], in my office today. [Please see my note below.] : Please see my note below. [Consult Closing:] : Thank you very much for allowing me to participate in the care of this patient.  If you have any questions, please do not hesitate to contact me. [Sincerely,] : Sincerely, [FreeTextEntry2] : Deborah Lombardo DO\par [address]\[place] [address]\par

## 2020-06-15 NOTE — ASSESSMENT
[FreeTextEntry1] : His physical exam still shows significant obesity. He now has the abdominal scar from the partial nephrectomy. The cardiopulmonary exam was normal.\par \par We are going to recheck his hormones but even more so recheck his PSA if it is indeed elevated we may go for a 4K and/or an MRI and we will hold off on treating his testosterone deficiency until we see what is happening with his PSA.\par \par \par low T- needs bloods and then ? xyosted, once CaP ruled out\par elevated PSA- ? real ? sampling error

## 2020-06-15 NOTE — HISTORY OF PRESENT ILLNESS
[Currently Experiencing ___] :  [unfilled] [FreeTextEntry1] : Christopher’s a 49 years old male that I last saw her in December 2019 which time we were going to teach him how to inject himself with testosterone as he had tried gel without success. We found that he had renal cancer sent him to Dr. Urbano recommended a partial nephrectomy but elected to go to [facility]. The right partial nephrectomy was done in March and he says the doctor there does not want to see him for a year but sent him back here to finish treatment of his testosterone deficiency. Please note he also had an elevated PSA which we were in the process of evaluating. He says nothing was done for that.  [Erectile Dysfunction] : Erectile Dysfunction [None] : None

## 2020-06-15 NOTE — LETTER HEADER
[FreeTextEntry3] : David Munroe M.D.\par Director of Urology\par [facility]\par [address] [place] [address], [address]\[place] [address]

## 2020-07-01 ENCOUNTER — APPOINTMENT (OUTPATIENT)
Dept: UROLOGY | Facility: CLINIC | Age: 50
End: 2020-07-01
Payer: COMMERCIAL

## 2020-07-01 VITALS
TEMPERATURE: 97 F | SYSTOLIC BLOOD PRESSURE: 132 MMHG | HEIGHT: 69 IN | BODY MASS INDEX: 39.25 KG/M2 | HEART RATE: 86 BPM | DIASTOLIC BLOOD PRESSURE: 79 MMHG | WEIGHT: 265 LBS

## 2020-07-01 PROCEDURE — 99214 OFFICE O/P EST MOD 30 MIN: CPT

## 2020-07-01 NOTE — ASSESSMENT
[FreeTextEntry1] : Some of this is related to his obesity as the estradiol was high normal ago the LH is low. However we going to hold off on his testosterone treatment until we know what’s going on with his prostate.\par \par With respect to the low testosterone I’m going to order a DEXA scan\par \par We discussed it at length and he had a lot of questions. I several different vectors there are concerns about his PSA. Density is high, the total is high especially for someone his age with a low free testosterone, PSA velocity of less than a year later has a rise of 1.2 ng/dL even more so his testosterone is low and you expect the PSA to go up even more if his testosterone was normalized.\par \par We discussed going straight to a biopsy which were not going to do. We’re going to get a 4K and if it is elevated we will consider an MRI.\par

## 2020-07-01 NOTE — HISTORY OF PRESENT ILLNESS
[FreeTextEntry1] : Christopher is 49 years old last seen on Anna 15, 2020 after recovering from a partial nephrectomy done at [facility]. He wanted to go back on testosterone but we had the unresolved issues of what is current testosterone as well as what happened to his PSA levels which were elevated. \par We sent him for blood tests including hormones and a PSA.\par

## 2020-07-01 NOTE — LETTER HEADER
[FreeTextEntry3] : David Munroe M.D.\par Director of Urology\par [facility]\par [address] [place] [address], [address]\[place] [address]

## 2020-07-01 NOTE — LETTER BODY
[Dear  ___] : Dear  [unfilled], [Courtesy Letter:] : I had the pleasure of seeing your patient, [unfilled], in my office today. [Please see my note below.] : Please see my note below. [Consult Closing:] : Thank you very much for allowing me to participate in the care of this patient.  If you have any questions, please do not hesitate to contact me. [Sincerely,] : Sincerely, [FreeTextEntry2] : Deborah Lombardo DO\par [address]\[place] [address]\par

## 2020-07-01 NOTE — PHYSICAL EXAM
[General Appearance - Well Developed] : well developed [General Appearance - Well Nourished] : well nourished [Normal Appearance] : normal appearance [Well Groomed] : well groomed [General Appearance - In No Acute Distress] : no acute distress [Abdomen Soft] : soft [Abdomen Tenderness] : non-tender [Abdomen Hernia] : no hernia was discovered [Costovertebral Angle Tenderness] : no ~M costovertebral angle tenderness [Penis Abnormality] : normal uncircumcised penis [Testes Mass (___cm)] : there were no testicular masses [Prostate Tenderness] : the prostate was not tender [No Prostate Nodules] : no prostate nodules [Prostate Size ___ gm] : prostate size [unfilled] gm [FreeTextEntry1] : COLTEN hard to reach but 15 grams and felt normal [] : no respiratory distress [Respiration, Rhythm And Depth] : normal respiratory rhythm and effort [Exaggerated Use Of Accessory Muscles For Inspiration] : no accessory muscle use [Oriented To Time, Place, And Person] : oriented to person, place, and time [Affect] : the affect was normal [Mood] : the mood was normal [Not Anxious] : not anxious [Normal Station and Gait] : the gait and station were normal for the patient's age

## 2020-07-01 NOTE — REVIEW OF SYSTEMS
[see HPI] : see HPI [Arthralgias] : arthralgias [Erectile Dysfunction] : erectile dysfunction [Negative] : Heme/Lymph

## 2020-07-03 ENCOUNTER — RESULT REVIEW (OUTPATIENT)
Age: 50
End: 2020-07-03

## 2020-07-03 ENCOUNTER — OUTPATIENT (OUTPATIENT)
Dept: OUTPATIENT SERVICES | Facility: HOSPITAL | Age: 50
LOS: 1 days | Discharge: HOME | End: 2020-07-03

## 2020-07-06 DIAGNOSIS — Z82.62 FAMILY HISTORY OF OSTEOPOROSIS: ICD-10-CM

## 2020-07-06 DIAGNOSIS — Z13.820 ENCOUNTER FOR SCREENING FOR OSTEOPOROSIS: ICD-10-CM

## 2020-07-08 DIAGNOSIS — R79.89 OTHER SPECIFIED ABNORMAL FINDINGS OF BLOOD CHEMISTRY: ICD-10-CM

## 2020-07-13 ENCOUNTER — APPOINTMENT (OUTPATIENT)
Dept: UROLOGY | Facility: CLINIC | Age: 50
End: 2020-07-13

## 2020-07-16 ENCOUNTER — APPOINTMENT (OUTPATIENT)
Dept: UROLOGY | Facility: CLINIC | Age: 50
End: 2020-07-16

## 2020-07-16 VITALS
SYSTOLIC BLOOD PRESSURE: 151 MMHG | WEIGHT: 272.8 LBS | DIASTOLIC BLOOD PRESSURE: 92 MMHG | HEART RATE: 94 BPM | BODY MASS INDEX: 41.34 KG/M2 | HEIGHT: 68 IN | TEMPERATURE: 97.9 F

## 2020-08-03 ENCOUNTER — APPOINTMENT (OUTPATIENT)
Dept: UROLOGY | Facility: CLINIC | Age: 50
End: 2020-08-03
Payer: COMMERCIAL

## 2020-08-03 VITALS
TEMPERATURE: 97.2 F | BODY MASS INDEX: 38.37 KG/M2 | DIASTOLIC BLOOD PRESSURE: 78 MMHG | HEART RATE: 81 BPM | WEIGHT: 268 LBS | SYSTOLIC BLOOD PRESSURE: 151 MMHG | HEIGHT: 70 IN

## 2020-08-03 PROCEDURE — 99214 OFFICE O/P EST MOD 30 MIN: CPT

## 2020-08-03 NOTE — REVIEW OF SYSTEMS
[see HPI] : see HPI [Arthralgias] : arthralgias [Erectile Dysfunction] : erectile dysfunction [Negative] : Psychiatric

## 2020-08-03 NOTE — LETTER HEADER
[FreeTextEntry3] : David Munroe M.D.\par Director of Urology\par [facility]\par [address] [place] [address], [address]\[place] [address]

## 2020-08-03 NOTE — LETTER BODY
[Courtesy Letter:] : I had the pleasure of seeing your patient, [unfilled], in my office today. [Dear  ___] : Dear  [unfilled], [Please see my note below.] : Please see my note below. [Consult Closing:] : Thank you very much for allowing me to participate in the care of this patient.  If you have any questions, please do not hesitate to contact me. [FreeTextEntry2] : Deborah Lombardo DO\par [address]\[place] [address]\par  [Sincerely,] : Sincerely,

## 2020-08-03 NOTE — ASSESSMENT
[FreeTextEntry1] : These numbers imply an increase percent risk of clinically significant prostate cancer. I emphasized to Christopher that this is not a diagnosis it is a vector. It tells us whether or not biopsy is worth doing or whether we should go for further testing or both.\par \par At this point especially given his girth COLTEN size of his prostate and numbers I need the MRI to both tell me whether we really need to do the biopsy and if needed where to biopsy in particular. He understands that even if it the MRI is negative we may still recommend a biopsy but we may recommend observation it depends on how positive or negative the MRI years.\par

## 2020-08-03 NOTE — HISTORY OF PRESENT ILLNESS
[FreeTextEntry1] : Christopher is a 49-year-old male who been following for and elevated PSA. A 4K score was done on July 2 and finally became available he is here for review\par \par In addition he has a relatively low testosterone which we don’t want to treat at this point in time until we know what is going on with his risk of prostate cancer. In the interim I sent him for a DEXA scan and he is here to review that as well [None] : no symptoms

## 2020-08-03 NOTE — PHYSICAL EXAM
[General Appearance - Well Developed] : well developed [General Appearance - Well Nourished] : well nourished [Normal Appearance] : normal appearance [Well Groomed] : well groomed [General Appearance - In No Acute Distress] : no acute distress [FreeTextEntry1] : morbidly obese [Respiration, Rhythm And Depth] : normal respiratory rhythm and effort [] : no respiratory distress [Exaggerated Use Of Accessory Muscles For Inspiration] : no accessory muscle use [Affect] : the affect was normal [Mood] : the mood was normal [Oriented To Time, Place, And Person] : oriented to person, place, and time [Normal Station and Gait] : the gait and station were normal for the patient's age [Not Anxious] : not anxious

## 2020-09-02 ENCOUNTER — OUTPATIENT (OUTPATIENT)
Dept: OUTPATIENT SERVICES | Facility: HOSPITAL | Age: 50
LOS: 1 days | Discharge: HOME | End: 2020-09-02
Payer: COMMERCIAL

## 2020-09-02 DIAGNOSIS — R97.20 ELEVATED PROSTATE SPECIFIC ANTIGEN [PSA]: ICD-10-CM

## 2020-09-02 PROCEDURE — 72197 MRI PELVIS W/O & W/DYE: CPT | Mod: 26

## 2020-09-10 ENCOUNTER — APPOINTMENT (OUTPATIENT)
Dept: UROLOGY | Facility: CLINIC | Age: 50
End: 2020-09-10
Payer: COMMERCIAL

## 2020-09-10 VITALS
TEMPERATURE: 98 F | BODY MASS INDEX: 38.37 KG/M2 | SYSTOLIC BLOOD PRESSURE: 147 MMHG | DIASTOLIC BLOOD PRESSURE: 84 MMHG | HEART RATE: 98 BPM | HEIGHT: 70 IN | WEIGHT: 268 LBS

## 2020-09-10 PROCEDURE — 99214 OFFICE O/P EST MOD 30 MIN: CPT

## 2020-09-10 NOTE — ASSESSMENT
[FreeTextEntry1] : PSA too high both by age and velocity with 4k=11\par \par With the negative MRI he thought that means he does not have a high risk of cancer. And I explained to him it means that radiologically he does not and there is not one area that we could focus on for the biopsy. However with these PSA parameters I have still strongly recommended A prostate biopsy done via the perineal Approach using transrectal guidance\par \par . We went over the risks and benefits the fact that it is not accountable procedure that we do would with some sedation – volume and local but that it will be uncomfortable. He wanted to know why we don't just wait and recheck it and we can but he said three different values over time spent several months apart that all showed a persistent a significant elevation of the total with a low free PSA. Unless the repeat came back extremely low then I would still want to do the biopsy. I offered for him to go get a second opinion, he's already had his partner nephrectomy at [facility] as he felt more comfortable with the surgical skills / postoperative care, We can do what he wants and wait and repeated and if it's elevated or even at the same level then go ahead with the biopsy. He can accept my recommendation sent schedule the biopsy or he can decide just to ignore the whole thing and think about it until he has a decision.\par \par Because he wants to consider An "not rush" into anything, he has chosen option Number two\par 1- proceed\par 2- get repeat bloods\par 3- 2nd opinion\par 4 do nothing\par \par \par note still holding on testoterone

## 2020-09-10 NOTE — PHYSICAL EXAM
[General Appearance - Well Developed] : well developed [General Appearance - Well Nourished] : well nourished [Normal Appearance] : normal appearance [Well Groomed] : well groomed [General Appearance - In No Acute Distress] : no acute distress [FreeTextEntry1] : morbidly obese [] : no respiratory distress [Respiration, Rhythm And Depth] : normal respiratory rhythm and effort [Exaggerated Use Of Accessory Muscles For Inspiration] : no accessory muscle use [Normal Station and Gait] : the gait and station were normal for the patient's age

## 2020-09-10 NOTE — HISTORY OF PRESENT ILLNESS
[FreeTextEntry1] : Christopher has been followed initially for an age specific and then for a general elevated PSA Regardless of age. He was sent for an MRI after a 4K score came back again with an elevated PSA and an elevated 4K of 11%. The MRI was done on 09/02/2020. It was read as a PIRADS-II, With a 43 gram Size prostate.\par \par He is here to discuss the results of the options\par \par He also has low testosterone which we are holding off on treating until we know what's going on with his risk of prostate cancer\par  [None] : no symptoms

## 2020-09-10 NOTE — LETTER HEADER
[FreeTextEntry3] : David Munroe M.D.\par Director of Urology\par [facility]\par [address] [place] [address], [address]\[place] [address]

## 2020-09-10 NOTE — LETTER BODY
[Courtesy Letter:] : I had the pleasure of seeing your patient, [unfilled], in my office today. [Dear  ___] : Dear  [unfilled], [Sincerely,] : Sincerely, [Please see my note below.] : Please see my note below. [FreeTextEntry2] : Deborah Lombardo DO\par [address]\[place] [address]\par

## 2020-11-03 ENCOUNTER — APPOINTMENT (OUTPATIENT)
Dept: UROLOGY | Facility: CLINIC | Age: 50
End: 2020-11-03
Payer: COMMERCIAL

## 2020-11-03 VITALS
HEIGHT: 70 IN | HEART RATE: 89 BPM | DIASTOLIC BLOOD PRESSURE: 88 MMHG | BODY MASS INDEX: 37.94 KG/M2 | SYSTOLIC BLOOD PRESSURE: 155 MMHG | TEMPERATURE: 97.7 F | WEIGHT: 265 LBS

## 2020-11-03 PROCEDURE — 99214 OFFICE O/P EST MOD 30 MIN: CPT

## 2020-11-03 PROCEDURE — 99072 ADDL SUPL MATRL&STAF TM PHE: CPT

## 2020-11-03 NOTE — HISTORY OF PRESENT ILLNESS
[None] : no symptoms [FreeTextEntry1] : Christopher was last seen on September 20, 2020 and because of an elevated biopsy despite an MRI that did not show an area of interest had recommended a biopsy but he elected cereal PSA’s. He had the bloods done on September 2 and is here to review.\par \par Please note he has low testosterone which we have not restarted treating while we’re waiting to see what the risk of prostate cancer is. He is aware of the saturation theory that is certain minimum amount of testosterone would be enough for prostate cancer but would still leave him feeling hypo gonadal and at an increased risk of metabolic syndrome but for now we are holding off on the testosterone.

## 2020-11-03 NOTE — LETTER BODY
[Dear  ___] : Dear  [unfilled], [Courtesy Letter:] : I had the pleasure of seeing your patient, [unfilled], in my office today. [Please see my note below.] : Please see my note below. [Consult Closing:] : Thank you very much for allowing me to participate in the care of this patient.  If you have any questions, please do not hesitate to contact me. [Sincerely,] : Sincerely, [FreeTextEntry2] : Deborah Lombardo DO\par [address]\[place] [address]\par

## 2020-11-03 NOTE — PHYSICAL EXAM
[General Appearance - Well Developed] : well developed [General Appearance - Well Nourished] : well nourished [Normal Appearance] : normal appearance [Well Groomed] : well groomed [General Appearance - In No Acute Distress] : no acute distress [] : no respiratory distress [Respiration, Rhythm And Depth] : normal respiratory rhythm and effort [Exaggerated Use Of Accessory Muscles For Inspiration] : no accessory muscle use [Normal Station and Gait] : the gait and station were normal for the patient's age [No Focal Deficits] : no focal deficits [FreeTextEntry1] : morbidly obese

## 2020-11-03 NOTE — ASSESSMENT
[FreeTextEntry1] : This is a rapid rate of fries, a low free PSA and despite the MRI not showing an area of specific interest I think he needs a biopsy. Because the MRI was negative it will just be done transparent real and not fusion.\par \par We will do a pre-biopsy ultrasound with Doppler interrogation looking to see if there's any spot in the prostate that has restricted flow making get an increased risk for cancer that we will try and do a targeted biopsy. Either way we would do a 12 core biopsy at a minimum. He understands that is done under lidocaine anesthesia with some value him on board. He can drive himself here he will need someone to drive him home.\par \par He knows to take an enema the morning of the biopsy and if he tends to be constipated one the night before. He will also give a urine sample in several days before just to make sure were not dealing with an infection.

## 2020-11-03 NOTE — LETTER HEADER
[FreeTextEntry3] : David Munroe M.D.\par Director of Urology\par [facility]\par [address] [place] [address], [address]\[place] [address]

## 2020-12-03 ENCOUNTER — APPOINTMENT (OUTPATIENT)
Dept: UROLOGY | Facility: CLINIC | Age: 50
End: 2020-12-03
Payer: COMMERCIAL

## 2020-12-03 VITALS
HEART RATE: 101 BPM | TEMPERATURE: 97.7 F | DIASTOLIC BLOOD PRESSURE: 91 MMHG | BODY MASS INDEX: 13.99 KG/M2 | SYSTOLIC BLOOD PRESSURE: 169 MMHG | WEIGHT: 97.7 LBS | HEIGHT: 70 IN

## 2020-12-03 PROCEDURE — 99072 ADDL SUPL MATRL&STAF TM PHE: CPT

## 2020-12-03 PROCEDURE — 99212 OFFICE O/P EST SF 10 MIN: CPT

## 2020-12-03 NOTE — PHYSICAL EXAM
[General Appearance - Well Developed] : well developed [General Appearance - Well Nourished] : well nourished [Normal Appearance] : normal appearance [Well Groomed] : well groomed [General Appearance - In No Acute Distress] : no acute distress [Abdomen Soft] : soft [Abdomen Tenderness] : non-tender [Costovertebral Angle Tenderness] : no ~M costovertebral angle tenderness [Urethral Meatus] : meatus normal [Urinary Bladder Findings] : the bladder was normal on palpation [Scrotum] : the scrotum was normal [Testes Tenderness] : no tenderness of the testes [Testes Mass (___cm)] : there were no testicular masses [Edema] : no peripheral edema [] : no respiratory distress [Respiration, Rhythm And Depth] : normal respiratory rhythm and effort [Exaggerated Use Of Accessory Muscles For Inspiration] : no accessory muscle use [Oriented To Time, Place, And Person] : oriented to person, place, and time [Affect] : the affect was normal [Mood] : the mood was normal [Not Anxious] : not anxious [Normal Station and Gait] : the gait and station were normal for the patient's age [No Focal Deficits] : no focal deficits [FreeTextEntry1] : There is a soft large mass extending from the left perineum

## 2020-12-03 NOTE — LETTER BODY
[Dear  ___] : Dear  [unfilled], [Courtesy Letter:] : I had the pleasure of seeing your patient, [unfilled], in my office today. [Please see my note below.] : Please see my note below. [Consult Closing:] : Thank you very much for allowing me to participate in the care of this patient.  If you have any questions, please do not hesitate to contact me. [Sincerely,] : Sincerely, [FreeTextEntry2] : Deborah Lombardo DO\par [address]\[place] [address]\par

## 2020-12-03 NOTE — ASSESSMENT
[FreeTextEntry1] : During set up patient was found to have a growth at the perineum where the needle would track. Recommend dermatology followup. Please note he tells me this is grown within the last few months I was not there when I had originally done the rectal exam on him and he understands that not knowing what it is I don't want to stickA a needle through it\par \par He has a soft tissue mass extending from the left perineum exactly where the needle would track. He needs to see dermatology to have this removed prior to any biopsy. We need to see what this before he undergoes biopsy. He will see dermatology have it removed and then contact us. We'll set him up promptly after.

## 2020-12-03 NOTE — HISTORY OF PRESENT ILLNESS
[None] : no symptoms [FreeTextEntry1] : 50-year-old male presents to the office today for transperineal prostate biopsy Schedule secondary to elevated PSA.

## 2020-12-03 NOTE — LETTER HEADER
[FreeTextEntry3] : David Munroe M.D.\par Director of Urology\par [facility]\par [address] [place] [address], [address]\[place] [address]

## 2020-12-31 ENCOUNTER — APPOINTMENT (OUTPATIENT)
Dept: UROLOGY | Facility: CLINIC | Age: 50
End: 2020-12-31
Payer: COMMERCIAL

## 2020-12-31 ENCOUNTER — LABORATORY RESULT (OUTPATIENT)
Age: 50
End: 2020-12-31

## 2020-12-31 VITALS
SYSTOLIC BLOOD PRESSURE: 155 MMHG | BODY MASS INDEX: 38.94 KG/M2 | HEART RATE: 87 BPM | DIASTOLIC BLOOD PRESSURE: 82 MMHG | TEMPERATURE: 98 F | HEIGHT: 70 IN | WEIGHT: 272 LBS

## 2020-12-31 PROCEDURE — 76872 US TRANSRECTAL: CPT

## 2020-12-31 PROCEDURE — 55700: CPT

## 2020-12-31 PROCEDURE — 99072 ADDL SUPL MATRL&STAF TM PHE: CPT

## 2020-12-31 PROCEDURE — 93975 VASCULAR STUDY: CPT

## 2021-01-14 ENCOUNTER — APPOINTMENT (OUTPATIENT)
Dept: UROLOGY | Facility: CLINIC | Age: 51
End: 2021-01-14
Payer: COMMERCIAL

## 2021-01-14 VITALS
DIASTOLIC BLOOD PRESSURE: 83 MMHG | BODY MASS INDEX: 39.14 KG/M2 | WEIGHT: 273.4 LBS | HEIGHT: 70 IN | HEART RATE: 79 BPM | SYSTOLIC BLOOD PRESSURE: 165 MMHG | TEMPERATURE: 97.8 F

## 2021-01-14 PROCEDURE — 99214 OFFICE O/P EST MOD 30 MIN: CPT

## 2021-01-14 PROCEDURE — 99072 ADDL SUPL MATRL&STAF TM PHE: CPT

## 2021-01-14 RX ORDER — DIAZEPAM 10 MG/1
10 TABLET ORAL
Qty: 1 | Refills: 0 | Status: COMPLETED | COMMUNITY
Start: 2020-11-03 | End: 2021-01-14

## 2021-01-14 RX ORDER — ENEMA 19; 7 G/133ML; G/133ML
7-19 ENEMA RECTAL
Qty: 1 | Refills: 0 | Status: COMPLETED | COMMUNITY
Start: 2020-11-03 | End: 2021-01-14

## 2021-01-15 NOTE — HISTORY OF PRESENT ILLNESS
[Fatigue] : fatigue [FreeTextEntry1] : Christopher had a trans perineal prostate biopsy in December 31, 2020 he tell me he had some hematuria mild not really bothersome for about a day or 201 the symptoms have gone away. He feels okay. I did call him with the results as they were normal and is here to decide what to do next to talk about restarting testosterone.

## 2021-01-15 NOTE — LETTER BODY
[Dear  ___] : Dear  [unfilled], [Courtesy Letter:] : I had the pleasure of seeing your patient, [unfilled], in my office today. [Please see my note below.] : Please see my note below. [Consult Closing:] : Thank you very much for allowing me to participate in the care of this patient.  If you have any questions, please do not hesitate to contact me. [Sincerely,] : Sincerely, [FreeTextEntry2] : Deborah Lombardo DO\par [address]\[place] [address]\par

## 2021-01-15 NOTE — ASSESSMENT
[FreeTextEntry1] : The biopsy did not show cancer though some of the sites had no epithelial cells being mainly fiber muscular. He is aware this does not meet he does not have cancer it just means there is not sufficient cancer that I was able to pick it up with this method he will still need to be watched and he may need a repeat biopsy in the future. However I’m comfortable enough starting him and his testosterone. He prefers self injection with testosterone cypionate due to cost. He does not know how to inject so we will have him get the medication bring it in and we will teach them how. Once he does he will start on one ML, 200 mg every two weeks get trough and peak just before and two days after the third dose and then follow-up to review adjusting the medication dose an interval depending on the results. He will get a PSA in six monthly will see help that tracks in the future we may consider repeat MRI or repeat biopsy.

## 2021-01-15 NOTE — PHYSICAL EXAM
[General Appearance - Well Developed] : well developed [General Appearance - Well Nourished] : well nourished [Normal Appearance] : normal appearance [Well Groomed] : well groomed [General Appearance - In No Acute Distress] : no acute distress [Abdomen Soft] : soft [Abdomen Tenderness] : non-tender [Abdomen Hernia] : no hernia was discovered [Costovertebral Angle Tenderness] : no ~M costovertebral angle tenderness [Urethral Meatus] : meatus normal [Penis Abnormality] : normal circumcised penis [Epididymis] : the epididymides were normal [Testes Tenderness] : no tenderness of the testes [Heart Rate And Rhythm] : Heart rate and rhythm were normal [Edema] : no peripheral edema [] : no respiratory distress [Respiration, Rhythm And Depth] : normal respiratory rhythm and effort [Exaggerated Use Of Accessory Muscles For Inspiration] : no accessory muscle use [Oriented To Time, Place, And Person] : oriented to person, place, and time [Affect] : the affect was normal [Mood] : the mood was normal [Not Anxious] : not anxious [Normal Station and Gait] : the gait and station were normal for the patient's age [FreeTextEntry1] : mild ecchymosis in scrotum

## 2021-01-15 NOTE — LETTER HEADER
[FreeTextEntry3] : David Munroe M.D.\par Director of Urology\par [facility]\par [address] [place] [address], [address]\[place] [address]

## 2021-01-19 ENCOUNTER — APPOINTMENT (OUTPATIENT)
Dept: PLASTIC SURGERY | Facility: CLINIC | Age: 51
End: 2021-01-19

## 2021-01-20 ENCOUNTER — APPOINTMENT (OUTPATIENT)
Dept: UROLOGY | Facility: CLINIC | Age: 51
End: 2021-01-20
Payer: COMMERCIAL

## 2021-01-20 VITALS
BODY MASS INDEX: 37.94 KG/M2 | DIASTOLIC BLOOD PRESSURE: 83 MMHG | WEIGHT: 265 LBS | SYSTOLIC BLOOD PRESSURE: 145 MMHG | HEIGHT: 70 IN | HEART RATE: 79 BPM | TEMPERATURE: 97.7 F

## 2021-01-20 PROCEDURE — 99072 ADDL SUPL MATRL&STAF TM PHE: CPT

## 2021-01-20 PROCEDURE — 99213 OFFICE O/P EST LOW 20 MIN: CPT

## 2021-01-20 NOTE — ASSESSMENT
[FreeTextEntry1] : low T, With a negative prostate trans perinel biopsy. We are going to be starting him on  q 2 weeks, With the first injection today.\par \par He will next inject 02/03 & 02/17 with blood 02/17 (pre shot) and 02/19 as peak \par He will f/u 03/01/2021 to decide Future dosing\par \par We will await the return of his sex life once he is eugonadal. He has sildenafil if that may help.

## 2021-01-20 NOTE — LETTER BODY
[Dear  ___] : Dear  [unfilled], [Courtesy Letter:] : I had the pleasure of seeing your patient, [unfilled], in my office today. [Please see my note below.] : Please see my note below. [Consult Closing:] : Thank you very much for allowing me to participate in the care of this patient.  If you have any questions, please do not hesitate to contact me. [Sincerely,] : Sincerely, [FreeTextEntry2] : Deborah Lombardo DO\par [address]\[place] [address]\par

## 2021-01-20 NOTE — HISTORY OF PRESENT ILLNESS
[FreeTextEntry1] : Christopher is a 50-year-old male with low testosterone we had been planning on giving testosterone replacement the PSA values came back as such that we ended up doing a biopsy. The path report came back as no evidence of cancer. He understands this does not mean he does not have a will not have cancer just that it wasn't diffuse enough for us to catch it on the most recent biopsy. He's going to start testosterone replacement and is here to learn how to use the medication properly.

## 2021-01-20 NOTE — PHYSICAL EXAM
[General Appearance - Well Developed] : well developed [General Appearance - Well Nourished] : well nourished [Normal Appearance] : normal appearance [Well Groomed] : well groomed [General Appearance - In No Acute Distress] : no acute distress [FreeTextEntry1] : morbidly obese [Abdomen Soft] : soft [Abdomen Tenderness] : non-tender [Abdomen Hernia] : no hernia was discovered [Costovertebral Angle Tenderness] : no ~M costovertebral angle tenderness [Heart Rate And Rhythm] : Heart rate and rhythm were normal [Edema] : no peripheral edema [] : no respiratory distress [Respiration, Rhythm And Depth] : normal respiratory rhythm and effort [Exaggerated Use Of Accessory Muscles For Inspiration] : no accessory muscle use [Oriented To Time, Place, And Person] : oriented to person, place, and time [Affect] : the affect was normal [Mood] : the mood was normal [Not Anxious] : not anxious [Normal Station and Gait] : the gait and station were normal for the patient's age

## 2021-01-20 NOTE — LETTER HEADER
[FreeTextEntry3] : David Munroe M.D.\par Director of Urology\par [facility]\par [address] [place] [address], [address]\[place] [address]

## 2021-02-19 ENCOUNTER — NON-APPOINTMENT (OUTPATIENT)
Age: 51
End: 2021-02-19

## 2021-03-18 ENCOUNTER — APPOINTMENT (OUTPATIENT)
Dept: UROLOGY | Facility: CLINIC | Age: 51
End: 2021-03-18
Payer: COMMERCIAL

## 2021-03-18 VITALS
DIASTOLIC BLOOD PRESSURE: 71 MMHG | TEMPERATURE: 97.9 F | SYSTOLIC BLOOD PRESSURE: 143 MMHG | BODY MASS INDEX: 38.94 KG/M2 | HEIGHT: 70 IN | WEIGHT: 272 LBS | HEART RATE: 84 BPM

## 2021-03-18 PROCEDURE — 99072 ADDL SUPL MATRL&STAF TM PHE: CPT

## 2021-03-18 PROCEDURE — 99214 OFFICE O/P EST MOD 30 MIN: CPT

## 2021-03-18 NOTE — LETTER HEADER
[FreeTextEntry3] : David Munroe M.D.\par Director of Urology\par [facility]\par [address] [place] [address], [address]\[place] [address]

## 2021-03-18 NOTE — PHYSICAL EXAM
[General Appearance - Well Developed] : well developed [General Appearance - Well Nourished] : well nourished [Normal Appearance] : normal appearance [Well Groomed] : well groomed [General Appearance - In No Acute Distress] : no acute distress [] : no respiratory distress [Respiration, Rhythm And Depth] : normal respiratory rhythm and effort [Exaggerated Use Of Accessory Muscles For Inspiration] : no accessory muscle use [Oriented To Time, Place, And Person] : oriented to person, place, and time [Affect] : the affect was normal [Mood] : the mood was normal [Not Anxious] : not anxious [Normal Station and Gait] : the gait and station were normal for the patient's age [No Focal Deficits] : no focal deficits [FreeTextEntry1] : Lower extremity edema

## 2021-03-18 NOTE — ASSESSMENT
[FreeTextEntry1] : His numbers don't particularly makes sense as his trough is higher than his peak. He may have not injected himself correctly. Or he tells me he works nights so when he got the blood drawn it was 2 ½ days We after the injection etc. Regardless it might not have been done properly and instead of worrying about it we will just have him continue at 200 mg – 1 cc q.2 weeks and obtain repeat trough/peak values and followup afterwards. I did review with him that though he should take the blood for the trough before he inject himself on Saturday, he is off on Saturday, he does not have to worry if he gets the peak Monday on his way home from work which would be about 1130 or 12 instead of at 9 AM.\par \par We therefore After review of both his ED and his low testosterone and review of the lab panels prescribed the medication and the syringes made sure he understood the timing and the risks of inappropriate testosterone use.

## 2021-03-18 NOTE — HISTORY OF PRESENT ILLNESS
[FreeTextEntry1] : Christopher is a 50-year-old male who we've been following for low testosterone and elevated PSA. He recently had a negative prostate biopsy and has elected to begin testosterone therapy.\par \par We started him on 1 cc of IM testosterone cypionate – 200 mg – q.2 weeks. He presents today to review his trough and peak values. He tells me on the medication though he has not had sex often he has been able to have satisfactory sex without the use of PDE five inhibitors\par  [Currently Experiencing ___] :  [unfilled] [Erectile Dysfunction] : Erectile Dysfunction

## 2021-03-18 NOTE — LETTER BODY
[Dear  ___] : Dear  [unfilled], [Courtesy Letter:] : I had the pleasure of seeing your patient, [unfilled], in my office today. [Please see my note below.] : Please see my note below. [Consult Closing:] : Thank you very much for allowing me to participate in the care of this patient.  If you have any questions, please do not hesitate to contact me. [Sincerely,] : Sincerely, [FreeTextEntry2] : Deborah Lombardo DO\par [address]\[place] [address]\par

## 2021-04-15 ENCOUNTER — APPOINTMENT (OUTPATIENT)
Dept: UROLOGY | Facility: CLINIC | Age: 51
End: 2021-04-15
Payer: COMMERCIAL

## 2021-04-15 VITALS
WEIGHT: 271 LBS | BODY MASS INDEX: 38.8 KG/M2 | DIASTOLIC BLOOD PRESSURE: 86 MMHG | HEIGHT: 70 IN | TEMPERATURE: 97.1 F | HEART RATE: 83 BPM | SYSTOLIC BLOOD PRESSURE: 142 MMHG

## 2021-04-15 DIAGNOSIS — R53.83 OTHER FATIGUE: ICD-10-CM

## 2021-04-15 DIAGNOSIS — N41.1 CHRONIC PROSTATITIS: ICD-10-CM

## 2021-04-15 PROCEDURE — 99214 OFFICE O/P EST MOD 30 MIN: CPT

## 2021-04-15 PROCEDURE — 99072 ADDL SUPL MATRL&STAF TM PHE: CPT

## 2021-04-15 RX ORDER — SYRINGE WITH NEEDLE, 1 ML 25GX5/8"
21G X 1" SYRINGE, EMPTY DISPOSABLE MISCELLANEOUS
Qty: 12 | Refills: 0 | Status: ACTIVE | COMMUNITY
Start: 2019-12-05 | End: 1900-01-01

## 2021-04-15 RX ORDER — SILDENAFIL 100 MG/1
100 TABLET, FILM COATED ORAL
Qty: 10 | Refills: 2 | Status: ACTIVE | OUTPATIENT
Start: 2019-08-05

## 2021-04-15 RX ORDER — TESTOSTERONE CYPIONATE 200 MG/ML
200 INJECTION, SOLUTION INTRAMUSCULAR
Qty: 4 | Refills: 0 | Status: ACTIVE | COMMUNITY
Start: 2019-12-05 | End: 1900-01-01

## 2021-04-15 NOTE — HISTORY OF PRESENT ILLNESS
[Currently Experiencing ___] :  [unfilled] [Erectile Dysfunction] : Erectile Dysfunction [FreeTextEntry1] : Christopher is a 50-year-old male who we've been following for low testosterone and elevated PSA. He is status post negative prostate biopsy, on 12/31/2020. \par \par Currently he is managed on 1 cc of intramuscular testosterone every 2 weeks.  He presents today to review his trough/peak values.\par \par Additionally he has an managed with sildenafil 100 mg as needed for erectile dysfunction.  He has not used the medication in a while although, states that it was successful at helping him achieve and maintain an erection.\par \par \par He states that recently he has been tired and has had low desire. [Fatigue] : no fatigue

## 2021-04-15 NOTE — PHYSICAL EXAM
[General Appearance - Well Nourished] : well nourished [General Appearance - Well Developed] : well developed [Normal Appearance] : normal appearance [Well Groomed] : well groomed [General Appearance - In No Acute Distress] : no acute distress [Abdomen Soft] : soft [Abdomen Tenderness] : non-tender [Costovertebral Angle Tenderness] : no ~M costovertebral angle tenderness [] : no respiratory distress [Respiration, Rhythm And Depth] : normal respiratory rhythm and effort [Exaggerated Use Of Accessory Muscles For Inspiration] : no accessory muscle use [Oriented To Time, Place, And Person] : oriented to person, place, and time [Affect] : the affect was normal [Mood] : the mood was normal [Not Anxious] : not anxious [Normal Station and Gait] : the gait and station were normal for the patient's age [No Focal Deficits] : no focal deficits [FreeTextEntry1] : Lower extremity edema

## 2021-04-15 NOTE — LETTER BODY
[Dear  ___] : Dear  [unfilled], [Courtesy Letter:] : I had the pleasure of seeing your patient, [unfilled], in my office today. [Please see my note below.] : Please see my note below. [Consult Closing:] : Thank you very much for allowing me to participate in the care of this patient.  If you have any questions, please do not hesitate to contact me. [Sincerely,] : Sincerely, [FreeTextEntry2] : Deborah Lombardo DO\par [address]\[place] [address]\par

## 2021-04-15 NOTE — ASSESSMENT
[FreeTextEntry1] : His physical exam is stable.  His testosterone levels are within normal physiologic range, although the bottom third of range.  He is still symptomatic and is experiencing decreased libido/energy.  We will change his dose from 1 cc q. 2 weeks to 1 cc q. 10 days.  He will get blood work just before and 2 days after the third injection follow-up for review.  All usage, dosage, mechanism of action, and adverse events fully reviewed.  Hello restart sildenafil to see if it still works for him.  He understands this may raise his estradiol and may give him levels are too high and we will have to be adjusted down.  If his levels become in the upper range and he still has no libido and fatigue he will need to look elsewhere for a cause\par \par I also explained that he may have a low libido because he had poor function and sometimes just starting sex again will refer libido he will try if no other reason just to see if that does anything

## 2021-04-15 NOTE — LETTER HEADER
[FreeTextEntry3] : David Munroe M.D.\par Director of Urology\par [facility]\par [address] [place] [address], [address]\[place] [address]

## 2021-05-08 ENCOUNTER — OUTPATIENT (OUTPATIENT)
Dept: OUTPATIENT SERVICES | Facility: HOSPITAL | Age: 51
LOS: 1 days | Discharge: HOME | End: 2021-05-08
Payer: COMMERCIAL

## 2021-05-08 ENCOUNTER — RESULT REVIEW (OUTPATIENT)
Age: 51
End: 2021-05-08

## 2021-05-08 DIAGNOSIS — C64.1 MALIGNANT NEOPLASM OF RIGHT KIDNEY, EXCEPT RENAL PELVIS: ICD-10-CM

## 2021-05-08 PROCEDURE — 74178 CT ABD&PLV WO CNTR FLWD CNTR: CPT | Mod: 26

## 2021-10-25 ENCOUNTER — TRANSCRIPTION ENCOUNTER (OUTPATIENT)
Age: 51
End: 2021-10-25

## 2021-10-29 ENCOUNTER — APPOINTMENT (OUTPATIENT)
Dept: UROLOGY | Facility: CLINIC | Age: 51
End: 2021-10-29

## 2021-12-06 ENCOUNTER — TRANSCRIPTION ENCOUNTER (OUTPATIENT)
Age: 51
End: 2021-12-06

## 2022-01-16 ENCOUNTER — TRANSCRIPTION ENCOUNTER (OUTPATIENT)
Age: 52
End: 2022-01-16

## 2022-06-01 ENCOUNTER — NON-APPOINTMENT (OUTPATIENT)
Age: 52
End: 2022-06-01

## 2022-10-16 ENCOUNTER — OUTPATIENT (OUTPATIENT)
Dept: OUTPATIENT SERVICES | Facility: HOSPITAL | Age: 52
LOS: 1 days | Discharge: HOME | End: 2022-10-16

## 2022-10-16 DIAGNOSIS — C64.1 MALIGNANT NEOPLASM OF RIGHT KIDNEY, EXCEPT RENAL PELVIS: ICD-10-CM

## 2022-10-16 PROCEDURE — 74183 MRI ABD W/O CNTR FLWD CNTR: CPT | Mod: 26

## 2022-10-23 ENCOUNTER — NON-APPOINTMENT (OUTPATIENT)
Age: 52
End: 2022-10-23

## 2022-11-21 ENCOUNTER — NON-APPOINTMENT (OUTPATIENT)
Age: 52
End: 2022-11-21

## 2022-11-27 ENCOUNTER — NON-APPOINTMENT (OUTPATIENT)
Age: 52
End: 2022-11-27

## 2023-01-27 ENCOUNTER — OUTPATIENT (OUTPATIENT)
Dept: OUTPATIENT SERVICES | Facility: HOSPITAL | Age: 53
LOS: 1 days | Discharge: HOME | End: 2023-01-27
Payer: COMMERCIAL

## 2023-01-27 DIAGNOSIS — M79.672 PAIN IN LEFT FOOT: ICD-10-CM

## 2023-01-27 PROCEDURE — 76882 US LMTD JT/FCL EVL NVASC XTR: CPT | Mod: 26,LT

## 2023-01-30 ENCOUNTER — NON-APPOINTMENT (OUTPATIENT)
Age: 53
End: 2023-01-30

## 2023-02-04 ENCOUNTER — NON-APPOINTMENT (OUTPATIENT)
Age: 53
End: 2023-02-04

## 2024-02-11 ENCOUNTER — NON-APPOINTMENT (OUTPATIENT)
Age: 54
End: 2024-02-11

## 2024-03-04 ENCOUNTER — APPOINTMENT (OUTPATIENT)
Dept: UROLOGY | Facility: CLINIC | Age: 54
End: 2024-03-04
Payer: COMMERCIAL

## 2024-03-04 VITALS
HEIGHT: 70 IN | HEART RATE: 94 BPM | WEIGHT: 256 LBS | SYSTOLIC BLOOD PRESSURE: 147 MMHG | OXYGEN SATURATION: 95 % | BODY MASS INDEX: 36.65 KG/M2 | DIASTOLIC BLOOD PRESSURE: 98 MMHG

## 2024-03-04 DIAGNOSIS — N52.9 MALE ERECTILE DYSFUNCTION, UNSPECIFIED: ICD-10-CM

## 2024-03-04 DIAGNOSIS — R68.82 DECREASED LIBIDO: ICD-10-CM

## 2024-03-04 DIAGNOSIS — E66.01 MORBID (SEVERE) OBESITY DUE TO EXCESS CALORIES: ICD-10-CM

## 2024-03-04 DIAGNOSIS — R79.89 OTHER SPECIFIED ABNORMAL FINDINGS OF BLOOD CHEMISTRY: ICD-10-CM

## 2024-03-04 PROCEDURE — G2211 COMPLEX E/M VISIT ADD ON: CPT

## 2024-03-04 PROCEDURE — 99214 OFFICE O/P EST MOD 30 MIN: CPT

## 2024-03-04 NOTE — LETTER BODY
[Dear  ___] : Dear  [unfilled], [Please see my note below.] : Please see my note below. [Courtesy Letter:] : I had the pleasure of seeing your patient, [unfilled], in my office today. [Consult Closing:] : Thank you very much for allowing me to participate in the care of this patient.  If you have any questions, please do not hesitate to contact me. [Sincerely,] : Sincerely, [FreeTextEntry2] : Deborah Lombardo DO\par  [address]  [address]\[place] [address]\par

## 2024-03-04 NOTE — HISTORY OF PRESENT ILLNESS
[Currently Experiencing ___] :  [unfilled] [Erectile Dysfunction] : Erectile Dysfunction [FreeTextEntry1] : Christopher is a 53-year-old male born August 27, 1970 last seen April 15, 2021, with a history of right partial nephrectomy at [facility] on 3/5/2020, for RCC currently being followed by [facility] for this issue, who we've been following for low testosterone and elevated PSA. He is status post negative prostate biopsy, on 12/31/2020.  He was last seen in April 2021 where he was managed on 1 cc of IM testosterone every 2 weeks.  Afterwards he was lost to follow-up.  He has been off of testosterone since 2021.  He reports he is doing well with some weakened erections.  He has used sildenafil 100 mg in the past with good efficacy.  He presents today to review his PSA, obtained by his PCP on 2/5/2024, which demonstrated a value of 6.11.  This value was elevated from his prior value of 5.24 on 10/22/2022.  He was biopsied in December 2020 and after that had a PSA of 4.24 with 11% free fraction.  MRI from 9/20 was negative for any discrete lesions.    [Fatigue] : no fatigue

## 2024-03-04 NOTE — ASSESSMENT
[FreeTextEntry1] : His PSA is significantly elevated from when he was originally biopsied and from last year.  Will repeat his PSA, along with a more complete hormone panel, and see what testosterone levels are.  If his testosterone levels are low and his PSA is rising we will obtain an MRI.  He will follow-up in a few weeks for review.  He understands the importance of follow-up regarding this issue and that prostate cancer cannot be ruled out at this time.

## 2024-03-04 NOTE — LETTER HEADER
[FreeTextEntry3] : David Munroe MD [facility], [address] [place] [address]

## 2024-03-04 NOTE — PHYSICAL EXAM
[General Appearance - Well Developed] : well developed [Normal Appearance] : normal appearance [General Appearance - Well Nourished] : well nourished [Well Groomed] : well groomed [General Appearance - In No Acute Distress] : no acute distress [Respiration, Rhythm And Depth] : normal respiratory rhythm and effort [Exaggerated Use Of Accessory Muscles For Inspiration] : no accessory muscle use [Normal Station and Gait] : the gait and station were normal for the patient's age [] : no rash [No Focal Deficits] : no focal deficits [Oriented To Time, Place, And Person] : oriented to person, place, and time [Affect] : the affect was normal [Mood] : the mood was normal [Not Anxious] : not anxious

## 2024-04-15 ENCOUNTER — APPOINTMENT (OUTPATIENT)
Dept: UROLOGY | Facility: CLINIC | Age: 54
End: 2024-04-15

## 2024-06-26 ENCOUNTER — APPOINTMENT (OUTPATIENT)
Dept: UROLOGY | Facility: CLINIC | Age: 54
End: 2024-06-26
Payer: COMMERCIAL

## 2024-06-26 VITALS
SYSTOLIC BLOOD PRESSURE: 150 MMHG | HEIGHT: 70 IN | DIASTOLIC BLOOD PRESSURE: 90 MMHG | WEIGHT: 264 LBS | TEMPERATURE: 98 F | HEART RATE: 88 BPM | BODY MASS INDEX: 37.8 KG/M2

## 2024-06-26 DIAGNOSIS — R97.20 ELEVATED PROSTATE, SPECIFIC ANTIGEN [PSA]: ICD-10-CM

## 2024-06-26 PROCEDURE — G2211 COMPLEX E/M VISIT ADD ON: CPT | Mod: NC,1L

## 2024-06-26 PROCEDURE — 99213 OFFICE O/P EST LOW 20 MIN: CPT

## 2024-08-20 ENCOUNTER — APPOINTMENT (OUTPATIENT)
Dept: UROLOGY | Facility: CLINIC | Age: 54
End: 2024-08-20

## 2024-08-24 ENCOUNTER — OUTPATIENT (OUTPATIENT)
Dept: OUTPATIENT SERVICES | Facility: HOSPITAL | Age: 54
LOS: 1 days | End: 2024-08-24
Payer: COMMERCIAL

## 2024-08-24 DIAGNOSIS — R97.20 ELEVATED PROSTATE SPECIFIC ANTIGEN [PSA]: ICD-10-CM

## 2024-08-24 PROCEDURE — A9579: CPT

## 2024-08-24 PROCEDURE — 72197 MRI PELVIS W/O & W/DYE: CPT

## 2024-08-24 PROCEDURE — 72197 MRI PELVIS W/O & W/DYE: CPT | Mod: 26

## 2024-08-25 DIAGNOSIS — R97.20 ELEVATED PROSTATE SPECIFIC ANTIGEN [PSA]: ICD-10-CM

## 2024-10-10 ENCOUNTER — APPOINTMENT (OUTPATIENT)
Dept: UROLOGY | Facility: CLINIC | Age: 54
End: 2024-10-10
Payer: COMMERCIAL

## 2024-10-10 VITALS
TEMPERATURE: 97.6 F | DIASTOLIC BLOOD PRESSURE: 99 MMHG | BODY MASS INDEX: 37.47 KG/M2 | SYSTOLIC BLOOD PRESSURE: 182 MMHG | HEIGHT: 69 IN | WEIGHT: 253 LBS | HEART RATE: 80 BPM

## 2024-10-10 DIAGNOSIS — R97.20 ELEVATED PROSTATE, SPECIFIC ANTIGEN [PSA]: ICD-10-CM

## 2024-10-10 DIAGNOSIS — E66.01 MORBID (SEVERE) OBESITY DUE TO EXCESS CALORIES: ICD-10-CM

## 2024-10-10 DIAGNOSIS — R79.89 OTHER SPECIFIED ABNORMAL FINDINGS OF BLOOD CHEMISTRY: ICD-10-CM

## 2024-10-10 DIAGNOSIS — N52.9 MALE ERECTILE DYSFUNCTION, UNSPECIFIED: ICD-10-CM

## 2024-10-10 PROCEDURE — G2211 COMPLEX E/M VISIT ADD ON: CPT | Mod: NC

## 2024-10-10 PROCEDURE — 99213 OFFICE O/P EST LOW 20 MIN: CPT

## 2024-10-30 ENCOUNTER — APPOINTMENT (OUTPATIENT)
Dept: ORTHOPEDIC SURGERY | Facility: CLINIC | Age: 54
End: 2024-10-30
Payer: COMMERCIAL

## 2024-10-30 DIAGNOSIS — S76.311A STRAIN OF MUSCLE, FASCIA AND TENDON OF THE POSTERIOR MUSCLE GROUP AT THIGH LEVEL, RIGHT THIGH, INITIAL ENCOUNTER: ICD-10-CM

## 2024-10-30 DIAGNOSIS — M23.91 UNSPECIFIED INTERNAL DERANGEMENT OF RIGHT KNEE: ICD-10-CM

## 2024-10-30 PROCEDURE — 73560 X-RAY EXAM OF KNEE 1 OR 2: CPT | Mod: 50

## 2024-10-30 PROCEDURE — 99204 OFFICE O/P NEW MOD 45 MIN: CPT

## 2024-10-30 RX ORDER — MELOXICAM 7.5 MG/1
7.5 TABLET ORAL
Qty: 30 | Refills: 2 | Status: ACTIVE | COMMUNITY
Start: 2024-10-30 | End: 1900-01-01

## 2024-10-30 RX ORDER — ACETAMINOPHEN 500 MG/1
500 TABLET ORAL 3 TIMES DAILY
Qty: 120 | Refills: 0 | Status: ACTIVE | COMMUNITY
Start: 2024-10-30 | End: 1900-01-01

## 2024-11-12 ENCOUNTER — APPOINTMENT (OUTPATIENT)
Dept: UROLOGY | Facility: CLINIC | Age: 54
End: 2024-11-12
Payer: COMMERCIAL

## 2024-11-12 VITALS
WEIGHT: 250 LBS | BODY MASS INDEX: 37.03 KG/M2 | OXYGEN SATURATION: 97 % | HEIGHT: 69 IN | DIASTOLIC BLOOD PRESSURE: 92 MMHG | HEART RATE: 83 BPM | SYSTOLIC BLOOD PRESSURE: 181 MMHG | TEMPERATURE: 97.1 F | RESPIRATION RATE: 16 BRPM

## 2024-11-12 DIAGNOSIS — N13.8 BENIGN PROSTATIC HYPERPLASIA WITH LOWER URINARY TRACT SYMPMS: ICD-10-CM

## 2024-11-12 DIAGNOSIS — R97.20 ELEVATED PROSTATE, SPECIFIC ANTIGEN [PSA]: ICD-10-CM

## 2024-11-12 DIAGNOSIS — N40.1 BENIGN PROSTATIC HYPERPLASIA WITH LOWER URINARY TRACT SYMPMS: ICD-10-CM

## 2024-11-12 PROCEDURE — G2211 COMPLEX E/M VISIT ADD ON: CPT | Mod: NC

## 2024-11-12 PROCEDURE — 99214 OFFICE O/P EST MOD 30 MIN: CPT

## 2024-11-23 ENCOUNTER — RESULT REVIEW (OUTPATIENT)
Age: 54
End: 2024-11-23

## 2024-11-23 ENCOUNTER — OUTPATIENT (OUTPATIENT)
Dept: OUTPATIENT SERVICES | Facility: HOSPITAL | Age: 54
LOS: 1 days | End: 2024-11-23
Payer: COMMERCIAL

## 2024-11-23 DIAGNOSIS — Z00.8 ENCOUNTER FOR OTHER GENERAL EXAMINATION: ICD-10-CM

## 2024-11-23 DIAGNOSIS — M23.91 UNSPECIFIED INTERNAL DERANGEMENT OF RIGHT KNEE: ICD-10-CM

## 2024-11-23 DIAGNOSIS — M25.561 PAIN IN RIGHT KNEE: ICD-10-CM

## 2024-11-23 PROCEDURE — 73721 MRI JNT OF LWR EXTRE W/O DYE: CPT | Mod: RT

## 2024-11-23 PROCEDURE — 73721 MRI JNT OF LWR EXTRE W/O DYE: CPT | Mod: 26,RT

## 2024-11-24 DIAGNOSIS — M23.91 UNSPECIFIED INTERNAL DERANGEMENT OF RIGHT KNEE: ICD-10-CM

## 2024-11-24 DIAGNOSIS — M25.561 PAIN IN RIGHT KNEE: ICD-10-CM

## 2024-12-03 ENCOUNTER — APPOINTMENT (OUTPATIENT)
Dept: UROLOGY | Facility: CLINIC | Age: 54
End: 2024-12-03
Payer: COMMERCIAL

## 2024-12-03 DIAGNOSIS — N40.1 BENIGN PROSTATIC HYPERPLASIA WITH LOWER URINARY TRACT SYMPMS: ICD-10-CM

## 2024-12-03 DIAGNOSIS — N13.8 BENIGN PROSTATIC HYPERPLASIA WITH LOWER URINARY TRACT SYMPMS: ICD-10-CM

## 2024-12-03 DIAGNOSIS — R97.20 ELEVATED PROSTATE, SPECIFIC ANTIGEN [PSA]: ICD-10-CM

## 2024-12-03 LAB
BILIRUB UR QL STRIP: NORMAL
COLLECTION METHOD: NORMAL
GLUCOSE UR-MCNC: 1000
HCG UR QL: 0.2 EU/DL
HGB UR QL STRIP.AUTO: NORMAL
KETONES UR-MCNC: NORMAL
LEUKOCYTE ESTERASE UR QL STRIP: NORMAL
NITRITE UR QL STRIP: NORMAL
PH UR STRIP: 5.5
PROT UR STRIP-MCNC: NORMAL
SP GR UR STRIP: 1.03

## 2024-12-03 PROCEDURE — 81003 URINALYSIS AUTO W/O SCOPE: CPT | Mod: QW

## 2024-12-03 PROCEDURE — 99213 OFFICE O/P EST LOW 20 MIN: CPT | Mod: 25

## 2024-12-19 ENCOUNTER — OUTPATIENT (OUTPATIENT)
Dept: OUTPATIENT SERVICES | Facility: HOSPITAL | Age: 54
LOS: 1 days | End: 2024-12-19
Payer: COMMERCIAL

## 2024-12-19 VITALS
OXYGEN SATURATION: 97 % | RESPIRATION RATE: 16 BRPM | TEMPERATURE: 98 F | WEIGHT: 254.85 LBS | HEART RATE: 85 BPM | DIASTOLIC BLOOD PRESSURE: 92 MMHG | HEIGHT: 70 IN | SYSTOLIC BLOOD PRESSURE: 163 MMHG

## 2024-12-19 DIAGNOSIS — Z01.818 ENCOUNTER FOR OTHER PREPROCEDURAL EXAMINATION: ICD-10-CM

## 2024-12-19 DIAGNOSIS — Z98.890 OTHER SPECIFIED POSTPROCEDURAL STATES: Chronic | ICD-10-CM

## 2024-12-19 DIAGNOSIS — Z90.5 ACQUIRED ABSENCE OF KIDNEY: Chronic | ICD-10-CM

## 2024-12-19 DIAGNOSIS — R97.20 ELEVATED PROSTATE SPECIFIC ANTIGEN [PSA]: ICD-10-CM

## 2024-12-19 LAB
A1C WITH ESTIMATED AVERAGE GLUCOSE RESULT: 8.8 % — HIGH (ref 4–5.6)
ALBUMIN SERPL ELPH-MCNC: 4.2 G/DL — SIGNIFICANT CHANGE UP (ref 3.5–5.2)
ALP SERPL-CCNC: 66 U/L — SIGNIFICANT CHANGE UP (ref 30–115)
ALT FLD-CCNC: 20 U/L — SIGNIFICANT CHANGE UP (ref 0–41)
ANION GAP SERPL CALC-SCNC: 10 MMOL/L — SIGNIFICANT CHANGE UP (ref 7–14)
APPEARANCE UR: CLEAR — SIGNIFICANT CHANGE UP
AST SERPL-CCNC: 19 U/L — SIGNIFICANT CHANGE UP (ref 0–41)
BILIRUB SERPL-MCNC: 0.3 MG/DL — SIGNIFICANT CHANGE UP (ref 0.2–1.2)
BILIRUB UR-MCNC: NEGATIVE — SIGNIFICANT CHANGE UP
BUN SERPL-MCNC: 13 MG/DL — SIGNIFICANT CHANGE UP (ref 10–20)
CALCIUM SERPL-MCNC: 9.4 MG/DL — SIGNIFICANT CHANGE UP (ref 8.4–10.5)
CHLORIDE SERPL-SCNC: 103 MMOL/L — SIGNIFICANT CHANGE UP (ref 98–110)
CO2 SERPL-SCNC: 23 MMOL/L — SIGNIFICANT CHANGE UP (ref 17–32)
COLOR SPEC: YELLOW — SIGNIFICANT CHANGE UP
CREAT SERPL-MCNC: 1 MG/DL — SIGNIFICANT CHANGE UP (ref 0.7–1.5)
DIFF PNL FLD: NEGATIVE — SIGNIFICANT CHANGE UP
EGFR: 88 ML/MIN/1.73M2 — SIGNIFICANT CHANGE UP
ESTIMATED AVERAGE GLUCOSE: 206 MG/DL — HIGH (ref 68–114)
GLUCOSE SERPL-MCNC: 150 MG/DL — HIGH (ref 70–99)
GLUCOSE UR QL: NEGATIVE MG/DL — SIGNIFICANT CHANGE UP
HCT VFR BLD CALC: 43.2 % — SIGNIFICANT CHANGE UP (ref 42–52)
HGB BLD-MCNC: 15 G/DL — SIGNIFICANT CHANGE UP (ref 14–18)
KETONES UR-MCNC: ABNORMAL MG/DL
LEUKOCYTE ESTERASE UR-ACNC: NEGATIVE — SIGNIFICANT CHANGE UP
MCHC RBC-ENTMCNC: 30.4 PG — SIGNIFICANT CHANGE UP (ref 27–31)
MCHC RBC-ENTMCNC: 34.7 G/DL — SIGNIFICANT CHANGE UP (ref 32–37)
MCV RBC AUTO: 87.4 FL — SIGNIFICANT CHANGE UP (ref 80–94)
NITRITE UR-MCNC: NEGATIVE — SIGNIFICANT CHANGE UP
NRBC # BLD: 0 /100 WBCS — SIGNIFICANT CHANGE UP (ref 0–0)
PH UR: 5.5 — SIGNIFICANT CHANGE UP (ref 5–8)
PLATELET # BLD AUTO: 291 K/UL — SIGNIFICANT CHANGE UP (ref 130–400)
PMV BLD: 10.3 FL — SIGNIFICANT CHANGE UP (ref 7.4–10.4)
POTASSIUM SERPL-MCNC: 3.9 MMOL/L — SIGNIFICANT CHANGE UP (ref 3.5–5)
POTASSIUM SERPL-SCNC: 3.9 MMOL/L — SIGNIFICANT CHANGE UP (ref 3.5–5)
PROT SERPL-MCNC: 6.4 G/DL — SIGNIFICANT CHANGE UP (ref 6–8)
PROT UR-MCNC: NEGATIVE MG/DL — SIGNIFICANT CHANGE UP
RBC # BLD: 4.94 M/UL — SIGNIFICANT CHANGE UP (ref 4.7–6.1)
RBC # FLD: 13.1 % — SIGNIFICANT CHANGE UP (ref 11.5–14.5)
SODIUM SERPL-SCNC: 136 MMOL/L — SIGNIFICANT CHANGE UP (ref 135–146)
SP GR SPEC: >1.03 — HIGH (ref 1–1.03)
UROBILINOGEN FLD QL: 0.2 MG/DL — SIGNIFICANT CHANGE UP (ref 0.2–1)
WBC # BLD: 9.97 K/UL — SIGNIFICANT CHANGE UP (ref 4.8–10.8)
WBC # FLD AUTO: 9.97 K/UL — SIGNIFICANT CHANGE UP (ref 4.8–10.8)

## 2024-12-19 PROCEDURE — 93010 ELECTROCARDIOGRAM REPORT: CPT

## 2024-12-19 PROCEDURE — 99214 OFFICE O/P EST MOD 30 MIN: CPT | Mod: 25

## 2024-12-19 PROCEDURE — 93005 ELECTROCARDIOGRAM TRACING: CPT

## 2024-12-19 PROCEDURE — 36415 COLL VENOUS BLD VENIPUNCTURE: CPT

## 2024-12-19 PROCEDURE — 80053 COMPREHEN METABOLIC PANEL: CPT

## 2024-12-19 PROCEDURE — 85027 COMPLETE CBC AUTOMATED: CPT

## 2024-12-19 PROCEDURE — 83036 HEMOGLOBIN GLYCOSYLATED A1C: CPT

## 2024-12-19 PROCEDURE — 87086 URINE CULTURE/COLONY COUNT: CPT

## 2024-12-19 PROCEDURE — 81003 URINALYSIS AUTO W/O SCOPE: CPT

## 2024-12-19 RX ORDER — DULAGLUTIDE 4.5 MG/.5ML
1.5 INJECTION, SOLUTION SUBCUTANEOUS
Refills: 0 | DISCHARGE

## 2024-12-19 NOTE — H&P PST ADULT - NSICDXPASTSURGICALHX_GEN_ALL_CORE_FT
PAST SURGICAL HISTORY:  H/O arthroscopy of right knee     H/O partial nephrectomy     H/O prostate biopsy     H/O umbilical hernia repair

## 2024-12-19 NOTE — H&P PST ADULT - HISTORY OF PRESENT ILLNESS
54 year old male here for prostate bx , states PSA is going up from 6.73 and exceeded to 9.71, but MRI was negative . Patient denies having any sx. and his numbers have been on the higher level for a few years, and had bx about 4 years ago  fos= 3  denies chest pain sob palp  denies recent uri or uti    Anesthesia Alert  YES--Difficult Airway- IV airway  NO--History of neck surgery or radiation  NO--Limited ROM of neck  NO--History of Malignant hyperthermia  NO--Personal or family history of Pseudocholinesterase deficiency  NO--Prior Anesthesia Complication  NO--Latex Allergy  NO--Loose teeth  NO--History of Rheumatoid Arthritis  YES--FARHAD  NO BLEEDING RISK  NO--Other_____    As per patient, this is their complete medical and surgical history, including medications both prescribed or over the counter.  Patient verbalized understanding of instructions and was given the opportunity to ask questions and have them answered.    58.2 points  The higher the score (maximum 58.2), the higher the functional status.  9.89 METs    0 points  RCRI Score  3.9 %  Risk of major cardiac event

## 2024-12-19 NOTE — H&P PST ADULT - NSICDXPASTMEDICALHX_GEN_ALL_CORE_FT
PAST MEDICAL HISTORY:  Cancer of kidney     DM (diabetes mellitus)     High cholesterol     Obese

## 2024-12-20 DIAGNOSIS — R97.20 ELEVATED PROSTATE SPECIFIC ANTIGEN [PSA]: ICD-10-CM

## 2024-12-20 DIAGNOSIS — Z01.818 ENCOUNTER FOR OTHER PREPROCEDURAL EXAMINATION: ICD-10-CM

## 2024-12-21 LAB
CULTURE RESULTS: SIGNIFICANT CHANGE UP
SPECIMEN SOURCE: SIGNIFICANT CHANGE UP

## 2025-01-02 ENCOUNTER — APPOINTMENT (OUTPATIENT)
Dept: UROLOGY | Facility: HOSPITAL | Age: 55
End: 2025-01-02

## 2025-01-02 RX ORDER — FENOFIBRATE,MICRONIZED 134 MG
1 CAPSULE ORAL
Refills: 0 | DISCHARGE

## 2025-01-02 RX ORDER — SITAGLIPTIN AND METFORMIN HYDROCHLORIDE 1000; 50 MG/1; MG/1
2 TABLET, FILM COATED ORAL
Refills: 0 | DISCHARGE

## 2025-01-02 RX ORDER — GLIMEPIRIDE 1 MG/1
1 TABLET ORAL
Refills: 0 | DISCHARGE

## 2025-01-02 RX ORDER — DULAGLUTIDE 4.5 MG/.5ML
1.5 INJECTION, SOLUTION SUBCUTANEOUS
Refills: 0 | DISCHARGE

## 2025-01-14 ENCOUNTER — APPOINTMENT (OUTPATIENT)
Dept: UROLOGY | Facility: CLINIC | Age: 55
End: 2025-01-14

## 2025-02-26 ENCOUNTER — NON-APPOINTMENT (OUTPATIENT)
Age: 55
End: 2025-02-26